# Patient Record
Sex: FEMALE | Race: WHITE | NOT HISPANIC OR LATINO | Employment: FULL TIME | URBAN - METROPOLITAN AREA
[De-identification: names, ages, dates, MRNs, and addresses within clinical notes are randomized per-mention and may not be internally consistent; named-entity substitution may affect disease eponyms.]

---

## 2019-08-26 LAB
LEFT EYE DM RETINOPATHY: POSITIVE
RIGHT EYE DM RETINOPATHY: POSITIVE

## 2019-10-15 LAB — HBA1C MFR BLD: 6 % (ref 4–6)

## 2019-12-13 ENCOUNTER — PATIENT OUTREACH (OUTPATIENT)
Dept: ADMINISTRATIVE | Facility: HOSPITAL | Age: 69
End: 2019-12-13

## 2019-12-13 NOTE — PROGRESS NOTES
Dear Nahomi,      [facility] is committed to your overall health.  To help you get the most out of each of your visits, we will review your information to make sure you are up to date on all of your recommended tests and/or procedures.      Crow Rahman MD  has found that your chart shows you may be due for the following:    One-time Hepatitis C Screening lab test(a viral condition that can harm the liver)  Tetanus Immunization  Osteoporosis screening (DEXA SCAN)  Fasting cholesterol labs (Lipid Panel)  Pneumonia Immunization  Mammogram    If you have had any of the above done at another facility, please bring the records with you or Fax them to [phone] so that your record at [facility] will be complete. If you have not had any of these tests or procedures done recently and would like to complete this testing ,  please call [phone] or send a message through your [facility] portal to your provider's office.     If you have an upcoming scheduled appointment for the above test and/or procedures, please disregard this letter.    If you are currently taking medication, please bring it with you to your appointment for review.      Thank you for letting us care for you,        Romi Brewster, Care Coordinator  [facility]  Phone: [phone]  Fax: [phone]

## 2019-12-13 NOTE — LETTER
December 13, 2019    Nahomi Loving  [address]  [place] [address]             [facility]  [address]  [place] [address]  Phone: [phone] [facility].  To help you get the most out of each of your visits, we will review your information to make sure you are up to date on all of your recommended tests and/or procedures.      Crow Rahman MD  has found that your chart shows you may be due for the following:    One-time Hepatitis C Screening lab test(a viral condition that can harm the liver)  Tetanus Immunization  Osteoporosis screening (DEXA SCAN)  Fasting cholesterol labs (Lipid Panel)  Pneumonia Immunization  Mammogram    If you have had any of the above done at another facility, please bring the records with you or Fax them to [phone] so that your record at [facility] will be complete. If you have not had any of these tests or procedures done recently and would like to complete this testing ,  please call [phone] or send a message through your [facility] portal to your provider's office.     If you have an upcoming scheduled appointment for the above test and/or procedures, please disregard this letter.    If you are currently taking medication, please bring it with you to your appointment for review.      Thank you for letting us care for you,    Romi Brewster, Care Coordinator  [facility]  Phone: [phone]  Fax: [phone]

## 2020-01-08 ENCOUNTER — OFFICE VISIT (OUTPATIENT)
Dept: FAMILY MEDICINE | Facility: CLINIC | Age: 70
End: 2020-01-08
Payer: MEDICARE

## 2020-01-08 VITALS
OXYGEN SATURATION: 96 % | TEMPERATURE: 98 F | HEIGHT: 60 IN | HEART RATE: 95 BPM | SYSTOLIC BLOOD PRESSURE: 126 MMHG | WEIGHT: 147.69 LBS | BODY MASS INDEX: 28.99 KG/M2 | DIASTOLIC BLOOD PRESSURE: 68 MMHG

## 2020-01-08 DIAGNOSIS — I10 HYPERTENSION, UNSPECIFIED TYPE: ICD-10-CM

## 2020-01-08 DIAGNOSIS — E79.0 HYPERURICEMIA: ICD-10-CM

## 2020-01-08 DIAGNOSIS — Z12.31 ENCOUNTER FOR SCREENING MAMMOGRAM FOR MALIGNANT NEOPLASM OF BREAST: ICD-10-CM

## 2020-01-08 DIAGNOSIS — Z12.39 BREAST CANCER SCREENING: ICD-10-CM

## 2020-01-08 DIAGNOSIS — E55.9 VITAMIN D DEFICIENCY: ICD-10-CM

## 2020-01-08 DIAGNOSIS — R25.2 LEG CRAMPS: ICD-10-CM

## 2020-01-08 DIAGNOSIS — E11.9 TYPE 2 DIABETES MELLITUS WITHOUT COMPLICATION, WITHOUT LONG-TERM CURRENT USE OF INSULIN: ICD-10-CM

## 2020-01-08 DIAGNOSIS — Z00.00 PREVENTATIVE HEALTH CARE: Primary | ICD-10-CM

## 2020-01-08 DIAGNOSIS — E03.9 HYPOTHYROIDISM, UNSPECIFIED TYPE: ICD-10-CM

## 2020-01-08 PROCEDURE — 90670 PCV13 VACCINE IM: CPT | Mod: S$GLB,,, | Performed by: FAMILY MEDICINE

## 2020-01-08 PROCEDURE — 90471 IMMUNIZATION ADMIN: CPT | Mod: S$GLB,,, | Performed by: FAMILY MEDICINE

## 2020-01-08 PROCEDURE — 3044F HG A1C LEVEL LT 7.0%: CPT | Mod: CPTII,S$GLB,, | Performed by: FAMILY MEDICINE

## 2020-01-08 PROCEDURE — 3078F PR MOST RECENT DIASTOLIC BLOOD PRESSURE < 80 MM HG: ICD-10-PCS | Mod: CPTII,S$GLB,, | Performed by: FAMILY MEDICINE

## 2020-01-08 PROCEDURE — 90471 PNEUMOCOCCAL CONJUGATE VACCINE 13-VALENT LESS THAN 5YO & GREATER THAN: ICD-10-PCS | Mod: S$GLB,,, | Performed by: FAMILY MEDICINE

## 2020-01-08 PROCEDURE — G0009 ADMIN PNEUMOCOCCAL VACCINE: HCPCS | Mod: 59,S$GLB,, | Performed by: FAMILY MEDICINE

## 2020-01-08 PROCEDURE — 3074F SYST BP LT 130 MM HG: CPT | Mod: CPTII,S$GLB,, | Performed by: FAMILY MEDICINE

## 2020-01-08 PROCEDURE — 3078F DIAST BP <80 MM HG: CPT | Mod: CPTII,S$GLB,, | Performed by: FAMILY MEDICINE

## 2020-01-08 PROCEDURE — 3074F PR MOST RECENT SYSTOLIC BLOOD PRESSURE < 130 MM HG: ICD-10-PCS | Mod: CPTII,S$GLB,, | Performed by: FAMILY MEDICINE

## 2020-01-08 PROCEDURE — 90670 PNEUMOCOCCAL CONJUGATE VACCINE 13-VALENT LESS THAN 5YO & GREATER THAN: ICD-10-PCS | Mod: S$GLB,,, | Performed by: FAMILY MEDICINE

## 2020-01-08 PROCEDURE — 3044F PR MOST RECENT HEMOGLOBIN A1C LEVEL <7.0%: ICD-10-PCS | Mod: CPTII,S$GLB,, | Performed by: FAMILY MEDICINE

## 2020-01-08 PROCEDURE — 90714 TD VACC NO PRESV 7 YRS+ IM: CPT | Mod: S$GLB,,, | Performed by: FAMILY MEDICINE

## 2020-01-08 PROCEDURE — 99387 PR PREVENTIVE VISIT,NEW,65 & OVER: ICD-10-PCS | Mod: 25,S$GLB,, | Performed by: FAMILY MEDICINE

## 2020-01-08 PROCEDURE — G0009 TD VACCINE GREATER THAN OR EQUAL TO 7YO PRESERVATIVE FREE IM: ICD-10-PCS | Mod: 59,S$GLB,, | Performed by: FAMILY MEDICINE

## 2020-01-08 PROCEDURE — 99999 PR PBB SHADOW E&M-EST. PATIENT-LVL III: CPT | Mod: PBBFAC,,, | Performed by: FAMILY MEDICINE

## 2020-01-08 PROCEDURE — 90714 TD VACCINE GREATER THAN OR EQUAL TO 7YO PRESERVATIVE FREE IM: ICD-10-PCS | Mod: S$GLB,,, | Performed by: FAMILY MEDICINE

## 2020-01-08 PROCEDURE — 99999 PR PBB SHADOW E&M-EST. PATIENT-LVL III: ICD-10-PCS | Mod: PBBFAC,,, | Performed by: FAMILY MEDICINE

## 2020-01-08 PROCEDURE — 99387 INIT PM E/M NEW PAT 65+ YRS: CPT | Mod: 25,S$GLB,, | Performed by: FAMILY MEDICINE

## 2020-01-08 RX ORDER — METFORMIN HYDROCHLORIDE 750 MG/1
1500 TABLET, EXTENDED RELEASE ORAL
COMMUNITY
Start: 2019-11-06 | End: 2020-09-08 | Stop reason: SDUPTHER

## 2020-01-08 RX ORDER — CLOBETASOL PROPIONATE 0.5 MG/G
CREAM TOPICAL
COMMUNITY
Start: 2019-12-02

## 2020-01-08 RX ORDER — ICOSAPENT ETHYL 1000 MG/1
CAPSULE ORAL
COMMUNITY
Start: 2019-12-10 | End: 2020-05-07 | Stop reason: ALTCHOICE

## 2020-01-08 RX ORDER — DICLOFENAC SODIUM 10 MG/G
2 GEL TOPICAL 4 TIMES DAILY PRN
COMMUNITY
Start: 2019-10-21 | End: 2020-09-08 | Stop reason: SDUPTHER

## 2020-01-08 RX ORDER — FLUTICASONE PROPIONATE 50 MCG
1 SPRAY, SUSPENSION (ML) NASAL
COMMUNITY
End: 2020-09-08 | Stop reason: SDUPTHER

## 2020-01-08 RX ORDER — SECUKINUMAB 150 MG/ML
INJECTION SUBCUTANEOUS
COMMUNITY
Start: 2020-01-07

## 2020-01-08 RX ORDER — AZELASTINE HYDROCHLORIDE 0.5 MG/ML
SOLUTION/ DROPS OPHTHALMIC
COMMUNITY
Start: 2019-03-20 | End: 2020-09-08 | Stop reason: SDUPTHER

## 2020-01-08 RX ORDER — MELOXICAM 15 MG/1
TABLET ORAL
COMMUNITY
Start: 2019-12-06 | End: 2020-09-08 | Stop reason: SDUPTHER

## 2020-01-08 RX ORDER — FERROUS SULFATE, DRIED 160(50) MG
1 TABLET, EXTENDED RELEASE ORAL
COMMUNITY

## 2020-01-08 NOTE — PROGRESS NOTES
CC: general exam and reestablish care    HPI:    Patient has not been seen since   SHe has recently been under the care of Dr. Suh and Dr. Jeff    DM2 - tolerating Metformin 750mg 2 tabs qhs  glimepride 1mg daily CAUSED SOME HYPOGLYCEMIA  BGs have been  250. Last Hgb A1c in 10/2019 was 6.3  Trulicity was being considered  GERD - controlled on Omeprazole 20mg daily  HTN - tolerating Avapro 150mg daily and Maxzide  Hypothyroidism - tolerating Levoxyl 25mcg  gout - tolerating allopurinol 200mg daily; No flares since her last appt.  Psoriasis - following with dermatology, Dr. Burciaga; using Cosentyx  HTG - taking vascepa 2 capsules BID  Vitamin d deficiency - taking vitamin D 2,000 IU daily    PAST MEDICAL HISTORY:  hypothyroidism   gastroesophageal reflux disease  h/o peptic ulcer  hypertension  fatty liver with hepatomegaly  DM2 (dx )  gout - 1st toe joint  depression with anxiety  RIGHT EYE CATRACT  psoriasis    PAST SURGICAL HISTORY:  abdominal hysterectomy  appendectomy   tonsillectomy  lap david   left knee arhtroscopy x 2  right hand CTS release  Right TKA    FAMILY HISTORY:  Father  at 79 of heart disease  Mother  87 of pneumonia  Sister  of an undiagnosed neurological disorder  Brother:  of lung cancer    SOCIAL HISTORY:  Tobacco use: 1/2 pack per day   Alcohol use:none  Ilicit drug use: none  Occupation:[employer]   Marital status:  son  at age 19 of electrocution  Children:2 (1  - [place])    REVIEW OF SYSTEMS:  GENERAL: No fever, chills, fatigability or weight changes.  SKIN/BREASTS: No rashes, sores, itching or changes in color or texture of skin. No changes in moles. No pain, swelling, tenderness, lumps, bleeding or discharge from nipples.  HEAD: No headaches or recent head trauma.   EYES: Visual acuity fine. Denies blurriness, tearing, itching, photophobia, diplopia, or visual changes.   EARS: Denies ear pain, discharge,  tinnitus or vertigo. Denies hearing loss.   NOSE: No loss of smell, epistaxis, postnasal drip, discharge, obstruction, or sneezing.  MOUTH & THROAT: No hoarseness, change in voice, swallowing difficulty. No excessive gum bleeding.   HEMATOLOGICAL/NODES: Denies swollen glands. No bleeding or bruising.  CHEST: No HOLDER, cyanosis, wheezing, cough or sputum production.  CARDIOVASCULAR: Denies chest pain, dyspnea, orthopnea, or palpitations.  GI/ABDOMEN: Appetite fine. No weight loss. Denies nausea, vomiting, diarrhea, constipation, abdominal pain, hematemesis or blood in stool.  URINARY: No dysuria,hematuria, nocturia, incontinence, flank pain, urgency, or urinary difficulty.  PERIPHERAL VASCULAR: No claudication, cold intolerance or cyanosis.  MUSCULOSKELETAL: No joint stiffness, pain, swelling, or loss of strength. Denies back pain.  NEUROLOGIC: No history of seizures, paralysis, alteration of gait or coordination.  ENDOCRINE: Sexual maturation. Denies weight change, heat/cold intolerance, hair loss or gain, loss of libido, polyuria, polydipsia, polyphagia, pruritus, unexplained flushing, or excessive sweating.   PSYCH: No crying spells. Denies anxiety symptoms.    PHYSICAL EXAM:  /68 (BP Location: Left arm, Patient Position: Sitting)   Pulse 95   Temp 98.4 °F (36.9 °C) (Oral)   Ht 5' (1.524 m)   Wt 67 kg (147 lb 11.3 oz)   SpO2 96%   BMI 28.85 kg/m²   APPEARANCE: Well nourished, well developed, neatly groomed, in no acute distress.   SKIN: Warm, moist and dry. No lesions or rashes.  EYES: Conjunctivae and lids clear. PERRL. EOMI.   EARS: External auditory canal clear. TM's intact with normal light reflex. No retraction or perforation.   NOSE: Mucosa pink. Nares clear.  MOUTH & THROAT: Oral mucosa pink. No tonsillar enlargement. No pharyngeal erythema or exudate.   NECK: Supple with full range of motion. No masses. No thyromegaly. No JVD or bruits.  LYMPH: No cervical, supraclavicular, axillary or inguinal  lymph node enlargement.  RESPIRATORY: Easy and unlabored respirations. Lungs clear to auscultation throughout all lung fields. No wheezes or rales.  CARDIOVASCULAR: Regular rate and rhythm. Normal S1, S2. No rubs, murmurs or gallops.   ABDOMEN: Bowel sounds normal. Nondistended and soft. No hepatosplenomegaly, masses, or tenderness.  EXTREMITIES: No edema or cyanosis. Pedal pulses 2+ bilaterally. No varicosities   MUSCULOSKELETAL: Full active range of motion without complaint of pain.   NEUROLOGIC: Cranial Nerves: II-XII grossly intact  Motor: 5/5 strength grossly in upper and lower extremities  Sensory: Intact to light touch distally.  Gait & Posture: Normal gait and fine motion.  PSYCH: Awake, alert, oriented to person, place, time, and situation. Pleasant and cooperative mood with intact judgment.    ASSESSMENT/PLAN:  Preventative health care  -     Hepatitis C antibody; Future; Expected date: 01/08/2020  -     (In Office Administered) Td Vaccine - Preservative Free  -     (In Office Administered) Pneumococcal Conjugate Vaccine (13 Valent) (IM)    Type 2 diabetes mellitus without complication, without long-term current use of insulin  -     Lipid panel; Future; Expected date: 01/08/2020  -     Microalbumin/creatinine urine ratio; Future; Expected date: 01/08/2020  -     Hemoglobin A1c; Future; Expected date: 01/08/2020    Hypertension, unspecified type  -     Comprehensive metabolic panel; Future; Expected date: 01/08/2020  -     CBC auto differential; Future; Expected date: 01/08/2020    Hyperuricemia  -     Uric acid; Future; Expected date: 01/08/2020    Hypothyroidism, unspecified type  -     TSH; Future; Expected date: 01/08/2020    Breast cancer screening  -     Mammo Digital Screening Bilat; Future; Expected date: 01/08/2020    Encounter for screening mammogram for malignant neoplasm of breast   -     Mammo Digital Screening Bilat; Future; Expected date: 01/08/2020    Vitamin D deficiency  -     Vitamin D;  Future; Expected date: 01/08/2020    Leg cramps  -     Magnesium; Future; Expected date: 01/08/2020      1. DM2 - continue Metformin 750mg BIDreemphasized diet and exercise; may add DPP4 inhibitor if Hgb A1c > 6.2  2. HTN (controlled) - continue Avapro 150mg daily and Maxzide  3. gout - continue allopurinol 200mg daily  4. hypothyroidism - continue Levothyroxine 25mcg daily  5. GERD (controlled) - continue Prilosec 20mg daily  Will get labs and schedule follow-up to review the results and discuss current status of each condition

## 2020-01-09 ENCOUNTER — PATIENT OUTREACH (OUTPATIENT)
Dept: ADMINISTRATIVE | Facility: HOSPITAL | Age: 70
End: 2020-01-09

## 2020-01-09 NOTE — PROGRESS NOTES
Health Maintenance Due   Topic Date Due    Hepatitis C Screening  1950    DEXA SCAN  11/04/1992    Shingles Vaccine (1 of 2) 11/04/2002    Lipid Panel  06/04/2016    Mammogram  08/09/2019    Influenza Vaccine (1) 09/01/2019     Future Appointments   Date Time Provider Department Center   1/10/2020  9:00 AM LAB, [facility] LAB [place]   1/20/2020  5:40 PM Crow Rahman MD [facility] MED [place]   1/29/2020  2:15 PM [facility] [id number] [facility] MAMMO [place]

## 2020-01-10 ENCOUNTER — TELEPHONE (OUTPATIENT)
Dept: FAMILY MEDICINE | Facility: CLINIC | Age: 70
End: 2020-01-10

## 2020-01-10 NOTE — TELEPHONE ENCOUNTER
----- Message from Bailey Duran sent at 1/10/2020  9:51 AM CST -----  Contact: Patient  Type: Needs Medical Advice    Who Called:  Patient  Best Call Back Number:   Additional Information: Calling to request that orders for labs be [facility]ed [facility] for her to complete tomorrow. Wanting to speak to the nurse.

## 2020-01-15 ENCOUNTER — TELEPHONE (OUTPATIENT)
Dept: FAMILY MEDICINE | Facility: CLINIC | Age: 70
End: 2020-01-15

## 2020-01-15 NOTE — TELEPHONE ENCOUNTER
----- Message from Jairon Ulloa sent at 1/15/2020 12:12 PM CST -----  Contact: Patient  Patient called in and wanted to speak with someone at the office regarding blood work and would like for the office to faxed over her results. The patient mention that she is leaving before 2pm and would like for the papers to be faxed before that time.The faxed number is    [phone]

## 2020-01-15 NOTE — TELEPHONE ENCOUNTER
Pt states that the lab orders are not coming over her fax. She is only receiving 1 page. Orders refaxed.

## 2020-01-15 NOTE — TELEPHONE ENCOUNTER
----- Message from Estefany Torres sent at 1/15/2020  1:33 PM CST -----  Contact: pt  Pt would like to be called back regarding her lab  orders    Pt can be reached at [phone]

## 2020-01-27 LAB
25(OH)D3 SERPL-MCNC: 49 NG/ML (ref 30–100)
ALBUMIN SERPL-MCNC: 4.4 G/DL (ref 3.6–5.1)
ALBUMIN/CREAT UR: 4 MCG/MG CREAT
ALBUMIN/GLOB SERPL: 1.5 (CALC) (ref 1–2.5)
ALP SERPL-CCNC: 60 U/L (ref 33–130)
ALT SERPL-CCNC: 24 U/L (ref 6–29)
AST SERPL-CCNC: 17 U/L (ref 10–35)
BASOPHILS # BLD AUTO: 73 CELLS/UL (ref 0–200)
BASOPHILS NFR BLD AUTO: 1 %
BILIRUB SERPL-MCNC: 0.3 MG/DL (ref 0.2–1.2)
BUN SERPL-MCNC: 30 MG/DL (ref 7–25)
BUN/CREAT SERPL: 30 (CALC) (ref 6–22)
CALCIUM SERPL-MCNC: 9.7 MG/DL (ref 8.6–10.4)
CHLORIDE SERPL-SCNC: 101 MMOL/L (ref 98–110)
CHOLEST SERPL-MCNC: 189 MG/DL
CHOLEST/HDLC SERPL: 3.4 (CALC)
CO2 SERPL-SCNC: 26 MMOL/L (ref 20–32)
CREAT SERPL-MCNC: 1.01 MG/DL (ref 0.5–0.99)
CREAT UR-MCNC: 114 MG/DL (ref 20–275)
EOSINOPHIL # BLD AUTO: 263 CELLS/UL (ref 15–500)
EOSINOPHIL NFR BLD AUTO: 3.6 %
ERYTHROCYTE [DISTWIDTH] IN BLOOD BY AUTOMATED COUNT: 13.8 % (ref 11–15)
GFRSERPLBLD MDRD-ARVRAT: 58 ML/MIN/1.73M2
GLOBULIN SER CALC-MCNC: 2.9 G/DL (CALC) (ref 1.9–3.7)
GLUCOSE SERPL-MCNC: 124 MG/DL (ref 65–99)
HBA1C MFR BLD: 6.1 % OF TOTAL HGB
HCT VFR BLD AUTO: 37.2 % (ref 35–45)
HCV AB S/CO SERPL IA: 0.01
HCV AB SERPL QL IA: NORMAL
HDLC SERPL-MCNC: 55 MG/DL
HGB BLD-MCNC: 12.8 G/DL (ref 11.7–15.5)
LDLC SERPL CALC-MCNC: 102 MG/DL (CALC)
LYMPHOCYTES # BLD AUTO: 2081 CELLS/UL (ref 850–3900)
LYMPHOCYTES NFR BLD AUTO: 28.5 %
MAGNESIUM SERPL-MCNC: 1.1 MG/DL (ref 1.5–2.5)
MCH RBC QN AUTO: 29.2 PG (ref 27–33)
MCHC RBC AUTO-ENTMCNC: 34.4 G/DL (ref 32–36)
MCV RBC AUTO: 84.9 FL (ref 80–100)
MICROALBUMIN UR-MCNC: 0.5 MG/DL
MONOCYTES # BLD AUTO: 423 CELLS/UL (ref 200–950)
MONOCYTES NFR BLD AUTO: 5.8 %
NEUTROPHILS # BLD AUTO: 4460 CELLS/UL (ref 1500–7800)
NEUTROPHILS NFR BLD AUTO: 61.1 %
NONHDLC SERPL-MCNC: 134 MG/DL (CALC)
PLATELET # BLD AUTO: 304 THOUSAND/UL (ref 140–400)
PMV BLD REES-ECKER: 11.8 FL (ref 7.5–12.5)
POTASSIUM SERPL-SCNC: 3.9 MMOL/L (ref 3.5–5.3)
PROT SERPL-MCNC: 7.3 G/DL (ref 6.1–8.1)
RBC # BLD AUTO: 4.38 MILLION/UL (ref 3.8–5.1)
SODIUM SERPL-SCNC: 139 MMOL/L (ref 135–146)
TRIGL SERPL-MCNC: 201 MG/DL
TSH SERPL-ACNC: 1.96 MIU/L (ref 0.4–4.5)
URATE SERPL-MCNC: 7.7 MG/DL (ref 2.5–7)
WBC # BLD AUTO: 7.3 THOUSAND/UL (ref 3.8–10.8)

## 2020-01-29 ENCOUNTER — HOSPITAL ENCOUNTER (OUTPATIENT)
Dept: RADIOLOGY | Facility: HOSPITAL | Age: 70
Discharge: HOME | End: 2020-01-29
Attending: FAMILY MEDICINE
Payer: MEDICARE

## 2020-01-29 ENCOUNTER — OFFICE VISIT (OUTPATIENT)
Dept: FAMILY MEDICINE | Facility: CLINIC | Age: 70
End: 2020-01-29
Payer: MEDICARE

## 2020-01-29 VITALS
TEMPERATURE: 98 F | DIASTOLIC BLOOD PRESSURE: 76 MMHG | WEIGHT: 150.81 LBS | SYSTOLIC BLOOD PRESSURE: 118 MMHG | OXYGEN SATURATION: 96 % | BODY MASS INDEX: 29.45 KG/M2 | HEART RATE: 87 BPM

## 2020-01-29 DIAGNOSIS — K21.9 GASTROESOPHAGEAL REFLUX DISEASE, ESOPHAGITIS PRESENCE NOT SPECIFIED: ICD-10-CM

## 2020-01-29 DIAGNOSIS — E11.9 TYPE 2 DIABETES MELLITUS WITHOUT COMPLICATION, WITHOUT LONG-TERM CURRENT USE OF INSULIN: Primary | ICD-10-CM

## 2020-01-29 DIAGNOSIS — Z12.31 ENCOUNTER FOR SCREENING MAMMOGRAM FOR MALIGNANT NEOPLASM OF BREAST: ICD-10-CM

## 2020-01-29 DIAGNOSIS — E79.0 HYPERURICEMIA: ICD-10-CM

## 2020-01-29 DIAGNOSIS — E03.9 HYPOTHYROIDISM, UNSPECIFIED TYPE: ICD-10-CM

## 2020-01-29 DIAGNOSIS — E78.5 HYPERLIPIDEMIA, UNSPECIFIED HYPERLIPIDEMIA TYPE: ICD-10-CM

## 2020-01-29 DIAGNOSIS — I10 HYPERTENSION, UNSPECIFIED TYPE: ICD-10-CM

## 2020-01-29 DIAGNOSIS — N39.0 URINARY TRACT INFECTION WITHOUT HEMATURIA, SITE UNSPECIFIED: ICD-10-CM

## 2020-01-29 DIAGNOSIS — Z12.39 BREAST CANCER SCREENING: ICD-10-CM

## 2020-01-29 LAB
BILIRUB SERPL-MCNC: NEGATIVE MG/DL
BILIRUB UR QL STRIP: NEGATIVE
BLOOD URINE, POC: NEGATIVE
CLARITY UR: CLEAR
COLOR UR: YELLOW
COLOR, POC UA: ABNORMAL
GLUCOSE UR QL STRIP: NEGATIVE
GLUCOSE UR QL STRIP: NORMAL
HGB UR QL STRIP: NEGATIVE
KETONES UR QL STRIP: NEGATIVE
KETONES UR QL STRIP: NEGATIVE
LEUKOCYTE ESTERASE UR QL STRIP: NEGATIVE
LEUKOCYTE ESTERASE URINE, POC: ABNORMAL
NITRITE UR QL STRIP: NEGATIVE
NITRITE, POC UA: NEGATIVE
PH UR STRIP: 6 [PH] (ref 5–8)
PH, POC UA: 6
PROT UR QL STRIP: NEGATIVE
PROTEIN, POC: NEGATIVE
SP GR UR STRIP: 1.02 (ref 1–1.03)
SPECIFIC GRAVITY, POC UA: 1.01
URN SPEC COLLECT METH UR: NORMAL
UROBILINOGEN, POC UA: NORMAL

## 2020-01-29 PROCEDURE — 3078F PR MOST RECENT DIASTOLIC BLOOD PRESSURE < 80 MM HG: ICD-10-PCS | Mod: CPTII,S$GLB,, | Performed by: FAMILY MEDICINE

## 2020-01-29 PROCEDURE — 77067 SCR MAMMO BI INCL CAD: CPT | Mod: TC,PO

## 2020-01-29 PROCEDURE — 3074F SYST BP LT 130 MM HG: CPT | Mod: CPTII,S$GLB,, | Performed by: FAMILY MEDICINE

## 2020-01-29 PROCEDURE — 99999 PR PBB SHADOW E&M-EST. PATIENT-LVL IV: CPT | Mod: PBBFAC,,, | Performed by: FAMILY MEDICINE

## 2020-01-29 PROCEDURE — 3074F PR MOST RECENT SYSTOLIC BLOOD PRESSURE < 130 MM HG: ICD-10-PCS | Mod: CPTII,S$GLB,, | Performed by: FAMILY MEDICINE

## 2020-01-29 PROCEDURE — 81002 URINALYSIS NONAUTO W/O SCOPE: CPT | Mod: S$GLB,,, | Performed by: FAMILY MEDICINE

## 2020-01-29 PROCEDURE — 77067 SCR MAMMO BI INCL CAD: CPT | Mod: 26,,, | Performed by: RADIOLOGY

## 2020-01-29 PROCEDURE — 1126F PR PAIN SEVERITY QUANTIFIED, NO PAIN PRESENT: ICD-10-PCS | Mod: S$GLB,,, | Performed by: FAMILY MEDICINE

## 2020-01-29 PROCEDURE — 77067 MAMMO DIGITAL SCREENING BILAT WITH TOMOSYNTHESIS_CAD: ICD-10-PCS | Mod: 26,,, | Performed by: RADIOLOGY

## 2020-01-29 PROCEDURE — 1159F MED LIST DOCD IN RCRD: CPT | Mod: S$GLB,,, | Performed by: FAMILY MEDICINE

## 2020-01-29 PROCEDURE — 77063 MAMMO DIGITAL SCREENING BILAT WITH TOMOSYNTHESIS_CAD: ICD-10-PCS | Mod: 26,,, | Performed by: RADIOLOGY

## 2020-01-29 PROCEDURE — 1101F PT FALLS ASSESS-DOCD LE1/YR: CPT | Mod: CPTII,S$GLB,, | Performed by: FAMILY MEDICINE

## 2020-01-29 PROCEDURE — 99999 PR PBB SHADOW E&M-EST. PATIENT-LVL IV: ICD-10-PCS | Mod: PBBFAC,,, | Performed by: FAMILY MEDICINE

## 2020-01-29 PROCEDURE — 3044F PR MOST RECENT HEMOGLOBIN A1C LEVEL <7.0%: ICD-10-PCS | Mod: CPTII,S$GLB,, | Performed by: FAMILY MEDICINE

## 2020-01-29 PROCEDURE — 81002 POCT URINE DIPSTICK WITHOUT MICROSCOPE: ICD-10-PCS | Mod: S$GLB,,, | Performed by: FAMILY MEDICINE

## 2020-01-29 PROCEDURE — 1101F PR PT FALLS ASSESS DOC 0-1 FALLS W/OUT INJ PAST YR: ICD-10-PCS | Mod: CPTII,S$GLB,, | Performed by: FAMILY MEDICINE

## 2020-01-29 PROCEDURE — 99214 OFFICE O/P EST MOD 30 MIN: CPT | Mod: 25,S$GLB,, | Performed by: FAMILY MEDICINE

## 2020-01-29 PROCEDURE — 99214 PR OFFICE/OUTPT VISIT, EST, LEVL IV, 30-39 MIN: ICD-10-PCS | Mod: 25,S$GLB,, | Performed by: FAMILY MEDICINE

## 2020-01-29 PROCEDURE — 1126F AMNT PAIN NOTED NONE PRSNT: CPT | Mod: S$GLB,,, | Performed by: FAMILY MEDICINE

## 2020-01-29 PROCEDURE — 3078F DIAST BP <80 MM HG: CPT | Mod: CPTII,S$GLB,, | Performed by: FAMILY MEDICINE

## 2020-01-29 PROCEDURE — 1159F PR MEDICATION LIST DOCUMENTED IN MEDICAL RECORD: ICD-10-PCS | Mod: S$GLB,,, | Performed by: FAMILY MEDICINE

## 2020-01-29 PROCEDURE — 81003 URINALYSIS AUTO W/O SCOPE: CPT | Mod: PO

## 2020-01-29 PROCEDURE — 77063 BREAST TOMOSYNTHESIS BI: CPT | Mod: 26,,, | Performed by: RADIOLOGY

## 2020-01-29 PROCEDURE — 3044F HG A1C LEVEL LT 7.0%: CPT | Mod: CPTII,S$GLB,, | Performed by: FAMILY MEDICINE

## 2020-01-29 RX ORDER — ROSUVASTATIN CALCIUM 10 MG/1
10 TABLET, COATED ORAL DAILY
Qty: 90 TABLET | Refills: 3 | Status: SHIPPED | OUTPATIENT
Start: 2020-01-29 | End: 2020-05-06 | Stop reason: SDUPTHER

## 2020-01-29 RX ORDER — ALLOPURINOL 300 MG/1
300 TABLET ORAL DAILY
Qty: 90 TABLET | Refills: 3 | Status: SHIPPED | OUTPATIENT
Start: 2020-01-29 | End: 2020-05-06 | Stop reason: SDUPTHER

## 2020-01-29 NOTE — PROGRESS NOTES
CC: diabetes    HPI:    Here to f/u on chronic health issues    DM2 - tolerating Metformin 750mg 2 tabs qhs  glimepride 1mg daily CAUSED SOME HYPOGLYCEMIA  BGs have been  250. Last Hgb A1c in 10/2019 was 6.3  Trulicity was being considered  GERD - controlled on Omeprazole 20mg daily  HTN - tolerating Avapro 150mg daily and Maxzide  Hypothyroidism - tolerating Levoxyl 25mcg  gout - tolerating allopurinol 200mg daily; No flares since her last appt.  Psoriasis - following with dermatology, Dr. Burciaga; using Cosentyx  HTG - taking vascepa 2 capsules BID  Vitamin d deficiency - taking vitamin D 2,000 IU daily    PAST MEDICAL HISTORY:  hypothyroidism   gastroesophageal reflux disease  h/o peptic ulcer  hypertension  fatty liver with hepatomegaly  DM2 (dx )  gout - 1st toe joint  depression with anxiety  RIGHT EYE CATRACT  psoriasis    PAST SURGICAL HISTORY:  abdominal hysterectomy  appendectomy   tonsillectomy  lap david   left knee arhtroscopy x 2  right hand CTS release  Right TKA    FAMILY HISTORY:  Father  at 79 of heart disease  Mother  87 of pneumonia  Sister  of an undiagnosed neurological disorder  Brother:  of lung cancer    SOCIAL HISTORY:  Tobacco use: 1/2 pack per day   Alcohol use:none  Ilicit drug use: none  Occupation:[employer]   Marital status:  son  at age 19 of electrocution  Children:2 (1  - [place])    REVIEW OF SYSTEMS:  GENERAL: No fever, chills, fatigability or weight changes.  SKIN/BREASTS: No rashes, sores, itching or changes in color or texture of skin. No changes in moles. No pain, swelling, tenderness, lumps, bleeding or discharge from nipples.  HEAD: No headaches or recent head trauma.   EYES: Visual acuity fine. Denies blurriness, tearing, itching, photophobia, diplopia, or visual changes.   EARS: Denies ear pain, discharge, tinnitus or vertigo. Denies hearing loss.   NOSE: No loss of smell, epistaxis, postnasal drip,  discharge, obstruction, or sneezing.  MOUTH & THROAT: No hoarseness, change in voice, swallowing difficulty. No excessive gum bleeding.   HEMATOLOGICAL/NODES: Denies swollen glands. No bleeding or bruising.  CHEST: No HOLDER, cyanosis, wheezing, cough or sputum production.  CARDIOVASCULAR: Denies chest pain, dyspnea, orthopnea, or palpitations.  GI/ABDOMEN: Appetite fine. No weight loss. Denies nausea, vomiting, diarrhea, constipation, abdominal pain, hematemesis or blood in stool.  URINARY: No dysuria,hematuria, nocturia, incontinence, flank pain, urgency, or urinary difficulty.  PERIPHERAL VASCULAR: No claudication, cold intolerance or cyanosis.  MUSCULOSKELETAL: No joint stiffness, pain, swelling, or loss of strength. Denies back pain.  NEUROLOGIC: No history of seizures, paralysis, alteration of gait or coordination.  ENDOCRINE: Sexual maturation. Denies weight change, heat/cold intolerance, hair loss or gain, loss of libido, polyuria, polydipsia, polyphagia, pruritus, unexplained flushing, or excessive sweating.   PSYCH: No crying spells. Denies anxiety symptoms.    PHYSICAL EXAM:  /76 (BP Location: Left arm, Patient Position: Sitting)   Pulse 87   Temp 98.1 °F (36.7 °C) (Oral)   Wt 68.4 kg (150 lb 12.7 oz)   SpO2 96%   BMI 29.45 kg/m²   APPEARANCE: Well nourished, well developed, neatly groomed, in no acute distress.   SKIN: Warm, moist and dry. No lesions or rashes.  EYES: Conjunctivae and lids clear. PERRL. EOMI.   EARS: External auditory canal clear. TM's intact with normal light reflex. No retraction or perforation.   NOSE: Mucosa pink. Nares clear.  MOUTH & THROAT: Oral mucosa pink. No tonsillar enlargement. No pharyngeal erythema or exudate.   NECK: Supple with full range of motion. No masses. No thyromegaly. No JVD or bruits.  LYMPH: No cervical, supraclavicular, axillary or inguinal lymph node enlargement.  RESPIRATORY: Easy and unlabored respirations. Lungs clear to auscultation throughout all  lung fields. No wheezes or rales.  CARDIOVASCULAR: Regular rate and rhythm. Normal S1, S2. No rubs, murmurs or gallops.   ABDOMEN: Bowel sounds normal. Nondistended and soft. No hepatosplenomegaly, masses, or tenderness.  EXTREMITIES: No edema or cyanosis. Pedal pulses 2+ bilaterally. No varicosities   MUSCULOSKELETAL: Full active range of motion without complaint of pain.   NEUROLOGIC: Cranial Nerves: II-XII grossly intact  Motor: 5/5 strength grossly in upper and lower extremities  Sensory: Intact to light touch distally.  Gait & Posture: Normal gait and fine motion.  PSYCH: Awake, alert, oriented to person, place, time, and situation. Pleasant and cooperative mood with intact judgment.  Foot exam: inspection normal, sensation intact; 2+ DP pulses bilaterally    Results for orders placed or performed in visit on 01/25/20   Lipid panel   Result Value Ref Range    Cholesterol 189 <200 mg/dL    HDL 55 >50 mg/dL    Triglycerides 201 (H) <150 mg/dL    LDL Cholesterol 102 (H) mg/dL (calc)    Hdl/Cholesterol Ratio 3.4 <5.0 (calc)    Non HDL Chol. (LDL+VLDL) 134 (H) <130 mg/dL (calc)   Microalbumin/creatinine urine ratio   Result Value Ref Range    Creatinine, Random Ur 114 20 - 275 mg/dL    Microalb, Ur 0.5 See Note: mg/dL    Microalb Creat Ratio 4 <30 mcg/mg creat   Magnesium   Result Value Ref Range    Magnesium 1.1 (L) 1.5 - 2.5 mg/dL   Uric acid   Result Value Ref Range    Uric Acid 7.7 (H) 2.5 - 7.0 mg/dL   Comprehensive metabolic panel   Result Value Ref Range    Glucose 124 (H) 65 - 99 mg/dL    BUN, Bld 30 (H) 7 - 25 mg/dL    Creatinine 1.01 (H) 0.50 - 0.99 mg/dL    eGFR if non  58 (L) > OR = 60 mL/min/1.73m2    eGFR if African American 67 > OR = 60 mL/min/1.73m2    BUN/Creatinine Ratio 30 (H) 6 - 22 (calc)    Sodium 139 135 - 146 mmol/L    Potassium 3.9 3.5 - 5.3 mmol/L    Chloride 101 98 - 110 mmol/L    CO2 26 20 - 32 mmol/L    Calcium 9.7 8.6 - 10.4 mg/dL    Total Protein 7.3 6.1 - 8.1 g/dL     Albumin 4.4 3.6 - 5.1 g/dL    Globulin, Total 2.9 1.9 - 3.7 g/dL (calc)    Albumin/Globulin Ratio 1.5 1.0 - 2.5 (calc)    Total Bilirubin 0.3 0.2 - 1.2 mg/dL    Alkaline Phosphatase 60 33 - 130 U/L    AST 17 10 - 35 U/L    ALT 24 6 - 29 U/L   CBC auto differential   Result Value Ref Range    WBC 7.3 3.8 - 10.8 Thousand/uL    RBC 4.38 3.80 - 5.10 Million/uL    Hemoglobin 12.8 11.7 - 15.5 g/dL    Hematocrit 37.2 35.0 - 45.0 %    Mean Corpuscular Volume 84.9 80.0 - 100.0 fL    Mean Corpuscular Hemoglobin 29.2 27.0 - 33.0 pg    Mean Corpuscular Hemoglobin Conc 34.4 32.0 - 36.0 g/dL    RDW 13.8 11.0 - 15.0 %    Platelets 304 140 - 400 Thousand/uL    MPV 11.8 7.5 - 12.5 fL    Neutrophils Absolute 4,460 1,500 - 7,800 cells/uL    Lymph # 2,081 850 - 3,900 cells/uL    Mono # 423 200 - 950 cells/uL    Eos # 263 15 - 500 cells/uL    Baso # 73 0 - 200 cells/uL    Neutrophils Relative 61.1 %    Lymph% 28.5 %    Mono% 5.8 %    Eosinophil% 3.6 %    Basophil% 1.0 %   Hepatitis C antibody   Result Value Ref Range    Hepatitis C Ab NON-REACTIVE NON-REACTIVE    Signal/Cutoff 0.01 <1.00   TSH   Result Value Ref Range    TSH 1.96 0.40 - 4.50 mIU/L   Vitamin D   Result Value Ref Range    Vitamin D, 25-OH, Total 49 30 - 100 ng/mL   Hemoglobin A1c   Result Value Ref Range    Hemoglobin A1C 6.1 (H) <5.7 % of total Hgb         ASSESSMENT/PLAN:  Type 2 diabetes mellitus without complication, without long-term current use of insulin  -     dulaglutide (TRULICITY) 0.75 mg/0.5 mL PnIj; Inject 0.5 mLs (0.75 mg total) into the skin once a week.  Dispense: 4 Syringe; Refill: 11  -     Comprehensive metabolic panel; Future; Expected date: 04/29/2020  -     Hemoglobin A1c; Future; Expected date: 04/29/2020    Hypertension, unspecified type    Hypothyroidism, unspecified type    Hyperuricemia  -     allopurinol (ZYLOPRIM) 300 MG tablet; Take 1 tablet (300 mg total) by mouth once daily.  Dispense: 90 tablet; Refill: 3  -     Uric acid; Future; Expected  date: 04/29/2020    Gastroesophageal reflux disease, esophagitis presence not specified    Urinary tract infection without hematuria, site unspecified  -     Urinalysis, Reflex to Urine Culture Urine, Clean Catch  -     POCT URINE DIPSTICK WITHOUT MICROSCOPE    Hyperlipidemia, unspecified hyperlipidemia type  -     rosuvastatin (CRESTOR) 10 MG tablet; Take 1 tablet (10 mg total) by mouth once daily.  Dispense: 90 tablet; Refill: 3  -     Lipid panel; Future; Expected date: 04/29/2020      1. DM2 - continue Metformin 750mg BID; reemphasized diet and exercise; add trulicity  2. HTN (controlled) - continue Avapro 150mg daily and Maxzide  3. gout - increase to allopurinol 300mg daily  4. hypothyroidism - continue Levothyroxine 25mcg daily  5. GERD (controlled) - continue Prilosec 20mg daily  5. HLD - stop vescepa; begin crestor 10mg daily  F/u 3 months with labs (printed at [facility])

## 2020-02-03 ENCOUNTER — TELEPHONE (OUTPATIENT)
Dept: FAMILY MEDICINE | Facility: CLINIC | Age: 70
End: 2020-02-03

## 2020-02-03 ENCOUNTER — TELEPHONE (OUTPATIENT)
Dept: RADIOLOGY | Facility: HOSPITAL | Age: 70
End: 2020-02-03

## 2020-02-06 ENCOUNTER — TELEPHONE (OUTPATIENT)
Dept: FAMILY MEDICINE | Facility: CLINIC | Age: 70
End: 2020-02-06

## 2020-02-06 NOTE — TELEPHONE ENCOUNTER
Pt states she was recently prescribed crestor and allopurinol and one of them has caused her to have an upset stomach. Pt states she stopped both of them a few days ago and her stomach has been fine. Pt would like to know what she should do. Please advise.

## 2020-02-06 NOTE — TELEPHONE ENCOUNTER
Have her resume just the Crestor and take that for 1 week. If no upset stomach, have her again try to resume the allopurinol.

## 2020-02-06 NOTE — TELEPHONE ENCOUNTER
Left message on machine   [Follow-Up Visit] : a follow-up visit for [FreeTextEntry2] : Right shoulder pain.

## 2020-02-06 NOTE — TELEPHONE ENCOUNTER
----- Message from Dianna Sabillon sent at 2/6/2020  8:48 AM CST -----  Type: Needs Medical Advice    Who Called:  Patient  Best Call Back Number: [phone]   Additional Information: Patient needs to speak with nurse concerning medication: Allopurinol or Crestor upsetting her stomach/not sure which one is causing issues/please call back to advise. (If no answer, please leave message)

## 2020-02-07 ENCOUNTER — HOSPITAL ENCOUNTER (OUTPATIENT)
Dept: RADIOLOGY | Facility: HOSPITAL | Age: 70
Discharge: HOME | End: 2020-02-07
Attending: FAMILY MEDICINE
Payer: MEDICARE

## 2020-02-07 ENCOUNTER — TELEPHONE (OUTPATIENT)
Dept: RADIOLOGY | Facility: HOSPITAL | Age: 70
End: 2020-02-07

## 2020-02-07 ENCOUNTER — TELEPHONE (OUTPATIENT)
Dept: FAMILY MEDICINE | Facility: CLINIC | Age: 70
End: 2020-02-07

## 2020-02-07 DIAGNOSIS — R92.8 ABNORMAL MAMMOGRAM OF LEFT BREAST: ICD-10-CM

## 2020-02-07 PROCEDURE — 77065 DX MAMMO INCL CAD UNI: CPT | Mod: TC,PO,LT

## 2020-02-07 PROCEDURE — 77061 BREAST TOMOSYNTHESIS UNI: CPT | Mod: 26,LT,, | Performed by: RADIOLOGY

## 2020-02-07 PROCEDURE — 76642 ULTRASOUND BREAST LIMITED: CPT | Mod: TC,PO,LT

## 2020-02-07 PROCEDURE — 76642 US BREAST LEFT LIMITED: ICD-10-PCS | Mod: 26,LT,, | Performed by: RADIOLOGY

## 2020-02-07 PROCEDURE — 77065 DX MAMMO INCL CAD UNI: CPT | Mod: 26,LT,, | Performed by: RADIOLOGY

## 2020-02-07 PROCEDURE — 76642 ULTRASOUND BREAST LIMITED: CPT | Mod: 26,LT,, | Performed by: RADIOLOGY

## 2020-02-07 PROCEDURE — 77065 MAMMO DIGITAL DIAGNOSTIC LEFT WITH TOMOSYNTHESIS_CAD: ICD-10-PCS | Mod: 26,LT,, | Performed by: RADIOLOGY

## 2020-02-07 PROCEDURE — 77061 BREAST TOMOSYNTHESIS UNI: CPT | Mod: TC,PO,LT

## 2020-02-07 PROCEDURE — 77061 MAMMO DIGITAL DIAGNOSTIC LEFT WITH TOMOSYNTHESIS_CAD: ICD-10-PCS | Mod: 26,LT,, | Performed by: RADIOLOGY

## 2020-02-07 NOTE — TELEPHONE ENCOUNTER
----- Message from Nivia Mack sent at 2/7/2020 12:15 PM CST -----  Contact: Patient  Type: Needs Medical Advice    Who Called:  Patient  Best Call Back Number: [phone]  Additional Information: Patient is calling to see if she can be prescribed a mild nerve medication for her biopsy she is having.Please call back and advise.

## 2020-02-07 NOTE — TELEPHONE ENCOUNTER
Miroslava from [facility] [place] called patient she is instructed Stereotactic breast biopsy is scheduled 2/10/2020

## 2020-02-07 NOTE — TELEPHONE ENCOUNTER
Patient notified of diagnostic mammogram results. Left breast biopsy will be scheduled at [facility] [place]. Miroslava will call her to schedule sometime next week.   Patient is also requesting medication for her anxiety

## 2020-02-07 NOTE — TELEPHONE ENCOUNTER
Pt states she just found out that she has to have a breast biopsy on Monday and states she is very nervous about this. Pt would like to know if she could get a mild rx for her nerves. Please advise.

## 2020-04-17 ENCOUNTER — TELEPHONE (OUTPATIENT)
Dept: FAMILY MEDICINE | Facility: CLINIC | Age: 70
End: 2020-04-17

## 2020-04-17 ENCOUNTER — OFFICE VISIT (OUTPATIENT)
Dept: FAMILY MEDICINE | Facility: CLINIC | Age: 70
End: 2020-04-17
Payer: MEDICARE

## 2020-04-17 DIAGNOSIS — R30.0 DYSURIA: Primary | ICD-10-CM

## 2020-04-17 DIAGNOSIS — R39.15 URINARY URGENCY: ICD-10-CM

## 2020-04-17 PROCEDURE — 99442 PR PHYSICIAN TELEPHONE EVALUATION 11-20 MIN: CPT | Mod: 95,,, | Performed by: NURSE PRACTITIONER

## 2020-04-17 PROCEDURE — 99442 PR PHYSICIAN TELEPHONE EVALUATION 11-20 MIN: ICD-10-PCS | Mod: 95,,, | Performed by: NURSE PRACTITIONER

## 2020-04-17 RX ORDER — CIPROFLOXACIN 500 MG/1
500 TABLET ORAL 2 TIMES DAILY
Qty: 10 TABLET | Refills: 0 | Status: SHIPPED | OUTPATIENT
Start: 2020-04-17 | End: 2020-04-22

## 2020-04-17 NOTE — TELEPHONE ENCOUNTER
----- Message from Raimundo Lopez sent at 4/17/2020  8:01 AM CDT -----  Contact: pt  Type: Needs Medical Advice    Who Called:  pt    Best Call Back Number: [phone]  Additional Information: pt states she has a UTI and would like some medication called in at her pharmacy. Please call to advise.     [facility] [address] [address]  [address] [address] [address]  Phone: [phone] Fax: [phone]

## 2020-04-17 NOTE — PROGRESS NOTES
Subjective:       Patient ID: Nahomi Loving is a 69 y.o. female.    Chief Complaint: No chief complaint on file.  This is her first visit with me in primary care.  She was last seen by her PCP, Josiah on 01/29/2020.  HPI  There were no vitals filed for this visit.  Review of Systems   Constitutional: Negative for fever.   Gastrointestinal: Negative for abdominal pain, nausea and vomiting.   Genitourinary: Positive for dysuria and urgency. Negative for vaginal bleeding and vaginal discharge.        Burning with urination and only small amounts   Musculoskeletal:        Complain of bilateral low back discomfort       Symptoms began Monday  She has increased fluids  The patient location is: [facility]  The chief complaint leading to consultation is: urinary urgency, only going little bit when go, urgency   Visit type: audio only  Total time spent with patient: 11:29-11:42  Each patient to whom he or she provides medical services by telemedicine is:  (1) informed of the relationship between the physician and patient and the respective role of any other health care provider with respect to management of the patient; and (2) notified that he or she may decline to receive medical services by telemedicine and may withdraw from such care at any time.    Notes:   temperature is currently 97.6-97.88  She states not currently sexually active  Denies blood  States no new bubble baths and is showering  States has had symptoms prior  No visible blood, denies temperature  Objective:      Physical Exam   Constitutional: She is oriented to person, place, and time. She is active and cooperative.   Neurological: She is alert and oriented to person, place, and time.   Psychiatric: She has a normal mood and affect. Her speech is normal and behavior is normal. Judgment and thought content normal. Cognition and memory are normal.       Assessment & Plan:       Dysuria  -     ciprofloxacin HCl (CIPRO) 500 MG tablet; Take 1 tablet  (500 mg total) by mouth 2 (two) times daily. for 5 days  Dispense: 10 tablet; Refill: 0    Urinary urgency  -     ciprofloxacin HCl (CIPRO) 500 MG tablet; Take 1 tablet (500 mg total) by mouth 2 (two) times daily. for 5 days  Dispense: 10 tablet; Refill: 0      Medication List with Changes/Refills   New Medications    CIPROFLOXACIN HCL (CIPRO) 500 MG TABLET    Take 1 tablet (500 mg total) by mouth 2 (two) times daily. for 5 days   Current Medications    ALLOPURINOL (ZYLOPRIM) 300 MG TABLET    Take 1 tablet (300 mg total) by mouth once daily.    ASPIRIN (ECOTRIN) 81 MG EC TABLET    Take 81 mg by mouth once daily.      AZELASTINE (OPTIVAR) 0.05 % OPHTHALMIC SOLUTION        AZELASTINE-FLUTICASONE 137-50 MCG/SPRAY SPRY NASSAL SPRAY    1 spray by Each Nare route 2 (two) times daily.    BETAMETHASONE VALERATE 0.1% (VALISONE) 0.1 % CREA    Apply topically 2 (two) times daily.    CALCIUM-VITAMIN D3 (OS-MYNOR 500 + D3) 500 MG(1,250MG) -200 UNIT PER TABLET    Take 1 tablet by mouth.    CLOBETASOL (TEMOVATE) 0.05 % CREAM        COSENTYX PEN, 2 PENS, 150 MG/ML PNIJ        DICLOFENAC SODIUM (VOLTAREN) 1 % GEL    Apply 2 g topically 4 (four) times daily as needed.    DOXYCYCLINE (MONODOX) 100 MG CAPSULE    Take 1 capsule (100 mg total) by mouth 2 (two) times daily.    DULAGLUTIDE (TRULICITY) 0.75 MG/0.5 ML PNIJ    Inject 0.5 mLs (0.75 mg total) into the skin once a week.    FLUTICASONE PROPIONATE (FLONASE) 50 MCG/ACTUATION NASAL SPRAY    1 spray by Nasal route.    GLIMEPIRIDE (AMARYL) 1 MG TABLET    Take 1 tablet (1 mg total) by mouth before breakfast.    IRBESARTAN (AVAPRO) 150 MG TABLET    Take 1 tablet (150 mg total) by mouth every evening.    LEVOTHYROXINE (SYNTHROID) 25 MCG TABLET    TAKE 1 TABLET DAILY    MELOXICAM (MOBIC) 15 MG TABLET    TAKE ONE TABLET BY MOUTH DAILY AS NEEDED    METFORMIN (GLUCOPHAGE-XR) 750 MG 24 HR TABLET    Take 1,500 mg by mouth.    METRONIDAZOLE 0.75% (METROCREAM) 0.75 % CREA    Apply topically 2  (two) times daily.    MOMETASONE (NASONEX) 50 MCG/ACTUATION NASAL SPRAY    2 sprays by Nasal route once daily.    MULTIVITAMIN CAPSULE    Take 1 capsule by mouth.    OMEPRAZOLE (PRILOSEC) 20 MG CAPSULE    Take 1 capsule (20 mg total) by mouth once daily.    ROSUVASTATIN (CRESTOR) 10 MG TABLET    Take 1 tablet (10 mg total) by mouth once daily.    TRIAMTERENE-HYDROCHLOROTHIAZIDE 75-50 MG (MAXZIDE) 75-50 MG PER TABLET    Take 1 tablet by mouth once daily.    VALACYCLOVIR (VALTREX) 1000 MG TABLET    Take 1 tablet (1,000 mg total) by mouth 3 (three) times daily.    VASCEPA 1 GRAM CAP        ZOLPIDEM (AMBIEN) 10 MG TAB    Take 1 tablet (10 mg total) by mouth nightly as needed.         No follow-ups on file.

## 2020-04-17 NOTE — TELEPHONE ENCOUNTER
----- Message from Zabrina Lee sent at 4/17/2020 11:05 AM CDT -----  Contact: self  Type:  Patient Returning Call    Who Called:  Patient   Who Left Message for Patient:  Bri  Does the patient know what this is regarding?:  Possible UTI wanted med  Best Call Back Number:  [phone] (home)   Additional Information:  Na

## 2020-04-24 ENCOUNTER — TELEPHONE (OUTPATIENT)
Dept: FAMILY MEDICINE | Facility: CLINIC | Age: 70
End: 2020-04-24

## 2020-04-24 NOTE — TELEPHONE ENCOUNTER
----- Message from Frederick Brown sent at 4/24/2020 12:20 PM CDT -----  Contact: same  Patient called in and stated she got a message earlier to call office but not sure what it is regarding?  Patient call back number is [phone]

## 2020-04-24 NOTE — TELEPHONE ENCOUNTER
Patient states she was returning a phone call she missed, looked through patient's chart did not see a reason for a call and no documentation. Informed patient that if we can figure out who called and why we will have them to give her a call back. Patient states understanding

## 2020-04-29 ENCOUNTER — OFFICE VISIT (OUTPATIENT)
Dept: FAMILY MEDICINE | Facility: CLINIC | Age: 70
End: 2020-04-29
Payer: MEDICARE

## 2020-04-29 DIAGNOSIS — I10 HYPERTENSION, UNSPECIFIED TYPE: ICD-10-CM

## 2020-04-29 DIAGNOSIS — E11.9 TYPE 2 DIABETES MELLITUS WITHOUT COMPLICATION, WITHOUT LONG-TERM CURRENT USE OF INSULIN: Primary | ICD-10-CM

## 2020-04-29 DIAGNOSIS — E79.0 HYPERURICEMIA: ICD-10-CM

## 2020-04-29 DIAGNOSIS — E03.9 HYPOTHYROIDISM, UNSPECIFIED TYPE: ICD-10-CM

## 2020-04-29 PROCEDURE — 3044F PR MOST RECENT HEMOGLOBIN A1C LEVEL <7.0%: ICD-10-PCS | Mod: CPTII,95,, | Performed by: FAMILY MEDICINE

## 2020-04-29 PROCEDURE — 1159F MED LIST DOCD IN RCRD: CPT | Mod: 95,,, | Performed by: FAMILY MEDICINE

## 2020-04-29 PROCEDURE — 99213 PR OFFICE/OUTPT VISIT, EST, LEVL III, 20-29 MIN: ICD-10-PCS | Mod: 95,,, | Performed by: FAMILY MEDICINE

## 2020-04-29 PROCEDURE — 1101F PR PT FALLS ASSESS DOC 0-1 FALLS W/OUT INJ PAST YR: ICD-10-PCS | Mod: CPTII,95,, | Performed by: FAMILY MEDICINE

## 2020-04-29 PROCEDURE — 3044F HG A1C LEVEL LT 7.0%: CPT | Mod: CPTII,95,, | Performed by: FAMILY MEDICINE

## 2020-04-29 PROCEDURE — 1159F PR MEDICATION LIST DOCUMENTED IN MEDICAL RECORD: ICD-10-PCS | Mod: 95,,, | Performed by: FAMILY MEDICINE

## 2020-04-29 PROCEDURE — 99213 OFFICE O/P EST LOW 20 MIN: CPT | Mod: 95,,, | Performed by: FAMILY MEDICINE

## 2020-04-29 PROCEDURE — 1101F PT FALLS ASSESS-DOCD LE1/YR: CPT | Mod: CPTII,95,, | Performed by: FAMILY MEDICINE

## 2020-04-29 NOTE — PROGRESS NOTES
CC: diabetes    HPI:    Here for 3 month f/u on chronic health issues    The patient location is: home  The chief complaint leading to consultation is: diabetes  Visit type: audiovisual  Total time spent with patient: 15 minutes  Each patient to whom he or she provides medical services by telemedicine is:  (1) informed of the relationship between the physician and patient and the respective role of any other health care provider with respect to management of the patient; and (2) notified that he or she may decline to receive medical services by telemedicine and may withdraw from such care at any time.    DM2 - tolerating Metformin 750mg 2 tabs qhs, Trulicity for the past 3 months  glimepride 1mg daily CAUSED SOME HYPOGLYCEMIA  BGs have been 100 . Last Hgb A1c was 6.3  GERD - controlled on Omeprazole 20mg daily  HTN - tolerating Avapro 150mg daily and Maxzide  Hypothyroidism - tolerating Levoxyl 25mcg  gout - tolerating allopurinol 200mg daily; No flares since her last appt.  Psoriasis - following with dermatology, Dr. Burciaga; using Cosentyx  HLD - taking Crestor 10mg  Vitamin d deficiency - taking vitamin D 2,000 IU daily    PAST MEDICAL HISTORY:  hypothyroidism   gastroesophageal reflux disease  h/o peptic ulcer  hypertension  fatty liver with hepatomegaly  DM2 (dx )  gout - 1st toe joint  depression with anxiety  RIGHT EYE CATRACT  psoriasis    PAST SURGICAL HISTORY:  abdominal hysterectomy  appendectomy   tonsillectomy  lap david   left knee arhtroscopy x 2  right hand CTS release  Right TKA    FAMILY HISTORY:  Father  at 79 of heart disease  Mother  87 of pneumonia  Sister  of an undiagnosed neurological disorder  Brother:  of lung cancer    SOCIAL HISTORY:  Tobacco use: 1/2 pack per day   Alcohol use:none  Ilicit drug use: none  Occupation:[employer]   Marital status:  son  at age 19 of electrocution  Children:2 (1  - rachael)    REVIEW OF  SYSTEMS:  GENERAL: No fever, chills, fatigability or weight changes.  SKIN/BREASTS: No rashes, sores, itching or changes in color or texture of skin. No changes in moles. No pain, swelling, tenderness, lumps, bleeding or discharge from nipples.  HEAD: No headaches or recent head trauma.   EYES: Visual acuity fine. Denies blurriness, tearing, itching, photophobia, diplopia, or visual changes.   EARS: Denies ear pain, discharge, tinnitus or vertigo. Denies hearing loss.   NOSE: No loss of smell, epistaxis, postnasal drip, discharge, obstruction, or sneezing.  MOUTH & THROAT: No hoarseness, change in voice, swallowing difficulty. No excessive gum bleeding.   HEMATOLOGICAL/NODES: Denies swollen glands. No bleeding or bruising.  CHEST: No HOLDER, cyanosis, wheezing, cough or sputum production.  CARDIOVASCULAR: Denies chest pain, dyspnea, orthopnea, or palpitations.  GI/ABDOMEN: Appetite fine. No weight loss. Denies nausea, vomiting, diarrhea, constipation, abdominal pain, hematemesis or blood in stool.  URINARY: No dysuria,hematuria, nocturia, incontinence, flank pain, urgency, or urinary difficulty.  PERIPHERAL VASCULAR: No claudication, cold intolerance or cyanosis.  MUSCULOSKELETAL: No joint stiffness, pain, swelling, or loss of strength. Denies back pain.  NEUROLOGIC: No history of seizures, paralysis, alteration of gait or coordination.  ENDOCRINE: Sexual maturation. Denies weight change, heat/cold intolerance, hair loss or gain, loss of libido, polyuria, polydipsia, polyphagia, pruritus, unexplained flushing, or excessive sweating.   PSYCH: No crying spells. Denies anxiety symptoms.    PHYSICAL EXAM:    APPEARANCE: Well nourished, well developed, neatly groomed, in no acute distress.   SKIN: Warm, moist and dry. No lesions or rashes.  EYES: Conjunctivae and lids clear. PERRL. EOMI.   NECK: Supple with full range of motion. No masses. No thyromegaly. No JVD or bruits.  RESPIRATORY: Easy and unlabored respirations.    EXTREMITIES: No edema or cyanosis. Pedal pulses 2+ bilaterally. No varicosities   PSYCH: Awake, alert, oriented to person, place, time, and situation. Pleasant and cooperative mood with intact judgment.    Results for orders placed or performed in visit on 01/29/20   Urinalysis, Reflex to Urine Culture Urine, Clean Catch   Result Value Ref Range    Specimen UA Urine, Clean Catch     Color, UA Yellow Yellow, Straw, Kimberley    Appearance, UA Clear Clear    pH, UA 6.0 5.0 - 8.0    Specific Gravity, UA 1.020 1.005 - 1.030    Protein, UA Negative Negative    Glucose, UA Negative Negative    Ketones, UA Negative Negative    Bilirubin (UA) Negative Negative    Occult Blood UA Negative Negative    Nitrite, UA Negative Negative    Leukocytes, UA Negative Negative   POCT URINE DIPSTICK WITHOUT MICROSCOPE   Result Value Ref Range    Color, UA light yellow     Spec Grav UA 1.010     pH, UA 6     WBC, UA trace     Nitrite, UA negative     Protein negative     Glucose, UA normal     Ketones, UA negative     Urobilinogen, UA normal     Bilirubin negative     Blood, UA negative          ASSESSMENT/PLAN:  Type 2 diabetes mellitus without complication, without long-term current use of insulin    Hypertension, unspecified type    Hyperuricemia    Hypothyroidism, unspecified type       Get labs as planned at [facility]; we will call with results and further advice  1. DM2 - continue Metformin 750mg BID; reemphasized diet and exercise; trulicity  2. HTN (controlled) - continue Avapro 150mg daily and Maxzide  3. gout - allopurinol 200mg daily  4. hypothyroidism - continue Levothyroxine 25mcg daily  5. GERD (controlled) - continue Prilosec 20mg daily  5. HLD - crestor 10mg daily  F/u 3 months

## 2020-05-06 DIAGNOSIS — I10 HTN (HYPERTENSION): ICD-10-CM

## 2020-05-06 DIAGNOSIS — K21.9 GERD (GASTROESOPHAGEAL REFLUX DISEASE): ICD-10-CM

## 2020-05-06 DIAGNOSIS — L71.9 ROSACEA: ICD-10-CM

## 2020-05-06 DIAGNOSIS — E78.5 HYPERLIPIDEMIA, UNSPECIFIED HYPERLIPIDEMIA TYPE: ICD-10-CM

## 2020-05-06 DIAGNOSIS — E03.9 HYPOTHYROIDISM, UNSPECIFIED TYPE: Primary | ICD-10-CM

## 2020-05-06 DIAGNOSIS — E79.0 HYPERURICEMIA: ICD-10-CM

## 2020-05-06 RX ORDER — ZOLPIDEM TARTRATE 10 MG/1
10 TABLET ORAL NIGHTLY PRN
Qty: 30 TABLET | Refills: 5 | Status: CANCELLED | OUTPATIENT
Start: 2020-05-06

## 2020-05-06 RX ORDER — METRONIDAZOLE 7.5 MG/G
CREAM TOPICAL 2 TIMES DAILY
Qty: 45 G | Refills: 5 | Status: CANCELLED | OUTPATIENT
Start: 2020-05-06 | End: 2021-05-06

## 2020-05-06 NOTE — TELEPHONE ENCOUNTER
----- Message from Yudith Butterfield MA sent at 5/6/2020  8:44 AM CDT -----  Contact: self/[phone]  Refill Request  Medication: zolpidem (AMBIEN) 10 mg Tab  triamterene-hydrochlorothiazide 75-50 mg (MAXZIDE) 75-50 mg per tablet   rosuvastatin (CRESTOR) 10 MG tablet   omeprazole (PRILOSEC) 20 MG capsule  levothyroxine (SYNTHROID) 25 MCG tablet   metronidazole 0.75% (METROCREAM) 0.75 % Crea  irbesartan (AVAPRO) 150 MG tablet  allopurinol (ZYLOPRIM) 300 MG tablet  Pharmacy and Phone: [facility] - [place] [address] [phone] (Phone)

## 2020-05-07 DIAGNOSIS — L71.9 ROSACEA: ICD-10-CM

## 2020-05-07 RX ORDER — OMEPRAZOLE 20 MG/1
20 CAPSULE, DELAYED RELEASE ORAL DAILY
Qty: 90 CAPSULE | Refills: 2 | Status: SHIPPED | OUTPATIENT
Start: 2020-05-07 | End: 2020-09-08 | Stop reason: SDUPTHER

## 2020-05-07 RX ORDER — ALLOPURINOL 300 MG/1
300 TABLET ORAL DAILY
Qty: 90 TABLET | Refills: 2 | Status: SHIPPED | OUTPATIENT
Start: 2020-05-07 | End: 2020-09-08 | Stop reason: SDUPTHER

## 2020-05-07 RX ORDER — METRONIDAZOLE 7.5 MG/G
CREAM TOPICAL 2 TIMES DAILY
Qty: 45 G | Refills: 5 | Status: SHIPPED | OUTPATIENT
Start: 2020-05-07 | End: 2020-09-08 | Stop reason: SDUPTHER

## 2020-05-07 RX ORDER — TRIAMTERENE AND HYDROCHLOROTHIAZIDE 75; 50 MG/1; MG/1
1 TABLET ORAL DAILY
Qty: 90 TABLET | Refills: 2 | Status: SHIPPED | OUTPATIENT
Start: 2020-05-07 | End: 2020-09-08 | Stop reason: SDUPTHER

## 2020-05-07 RX ORDER — ROSUVASTATIN CALCIUM 10 MG/1
10 TABLET, COATED ORAL DAILY
Qty: 90 TABLET | Refills: 2 | Status: SHIPPED | OUTPATIENT
Start: 2020-05-07 | End: 2020-09-08 | Stop reason: SDUPTHER

## 2020-05-07 RX ORDER — IRBESARTAN 150 MG/1
150 TABLET ORAL NIGHTLY
Qty: 90 TABLET | Refills: 2 | Status: SHIPPED | OUTPATIENT
Start: 2020-05-07 | End: 2020-09-08 | Stop reason: SDUPTHER

## 2020-05-07 RX ORDER — ZOLPIDEM TARTRATE 10 MG/1
10 TABLET ORAL NIGHTLY PRN
Qty: 30 TABLET | Refills: 5 | Status: SHIPPED | OUTPATIENT
Start: 2020-05-07 | End: 2020-09-08 | Stop reason: SDUPTHER

## 2020-05-07 RX ORDER — LEVOTHYROXINE SODIUM 25 UG/1
25 TABLET ORAL DAILY
Qty: 90 TABLET | Refills: 2 | Status: SHIPPED | OUTPATIENT
Start: 2020-05-07 | End: 2020-09-08 | Stop reason: SDUPTHER

## 2020-05-07 NOTE — PROGRESS NOTES
Refill Authorization Note    is requesting a refill authorization.    Brief assessment and rationale for refill: DEFER; not previously written by PCP// APPROVE: bridget (crestor)      Medication-related problems identified:   Dose adjustment  Drug-drug interaction    Medication Therapy Plan: addressed DDis per adherence data and provider notes; pended current therapy regiments per recent encounter notes;  PLEASE REVIEW ALLOPURINOL IG< increased to 300mg then stated as taking 200mg  Pt recently established care, not written by pcp, DEFER     Medication reconciliation completed: No                         Comments:      Automatic Epic Protocol Generated Data:    Requested Prescriptions   Pending Prescriptions Disp Refills    allopurinoL (ZYLOPRIM) 300 MG tablet 90 tablet 2     Sig: Take 1 tablet (300 mg total) by mouth once daily.       Endocrinology:  Gout Agents - allopurinol Failed - 5/7/2020 10:33 AM        Failed - Uric Acid is 5.9 or below and within 360 days     Uric Acid   Date Value Ref Range Status   01/25/2020 7.7 (H) 2.5 - 7.0 mg/dL Final     Comment:     Therapeutic target for gout patients: <6.0 mg/dL         09/10/2011 6.2 (H) 2.4 - 5.7 mg/dl Final   06/04/2011 9.1 (H) 2.4 - 5.7 mg/dl Final              Passed - Patient is at least 18 years old        Passed - Office visit in past 12 months or future 90 days.     Recent Outpatient Visits            1 week ago Type 2 diabetes mellitus without complication, without long-term current use of insulin    [facility] Crow Rahman MD    2 weeks ago Dysuria    [facility] Fani Velazco, DNP, APRN    3 months ago Type 2 diabetes mellitus without complication, without long-term current use of insulin    [facility] Crow Rahman MD    4 months ago Preventative health care    [facility] Crow Rahman MD    5 years ago UTI (lower urinary tract infection)    [facility] Crow Rahman MD                    Passed - Cr is 1.3 or below and within 360 days     Creatinine   Date Value Ref Range Status   01/25/2020 1.01 (H) 0.50 - 0.99 mg/dL Final     Comment:     For patients >49 years of age, the reference limit  for Creatinine is approximately 13% higher for people  identified as -American.        09/10/2011 0.7 0.5 - 1.4 mg/dl Final   06/04/2011 0.8 0.5 - 1.4 mg/dl Final              Passed - WBC in normal range and within 360 days     WBC   Date Value Ref Range Status   01/25/2020 7.3 3.8 - 10.8 Thousand/uL Final   01/17/2006 8.64 4.8 - 10.8 K/uL Final              Passed - RBC in normal range and within 360 days     RBC   Date Value Ref Range Status   01/25/2020 4.38 3.80 - 5.10 Million/uL Final   01/17/2006 4.96 4.00 - 5.40 M/uL Final              Passed - HGB in normal range and within 360 days     Hemoglobin   Date Value Ref Range Status   01/25/2020 12.8 11.7 - 15.5 g/dL Final   01/17/2006 14.6 12.0 - 16.0 gm/dl Final              Passed - HCT in normal range and within 360 days     Hematocrit   Date Value Ref Range Status   01/25/2020 37.2 35.0 - 45.0 % Final   01/17/2006 43.4 37.0 - 48.5 % Final              Passed - PLT in normal range and within 360 days     Platelets   Date Value Ref Range Status   01/25/2020 304 140 - 400 Thousand/uL Final   01/17/2006 256 150 - 350 K/uL Final              Passed - ALT is 94 or below and within 360 days     ALT   Date Value Ref Range Status   01/25/2020 24 6 - 29 U/L Final   09/10/2011 23 10 - 44 U/L Final   06/04/2011 23 10 - 44 U/L Final              Passed - AST is 54 or below and within 360 days     AST   Date Value Ref Range Status   01/25/2020 17 10 - 35 U/L Final   09/10/2011 18 10 - 40 U/L Final   06/04/2011 18 10 - 40 U/L Final              Passed - eGFR is 60 or above and within 360 days     eGFR if non    Date Value Ref Range Status   01/25/2020 58 (L) > OR = 60 mL/min/1.73m2 Final    09/10/2011 >60 >60 mL/min Final   06/04/2011 >60 >60 mL/min Final     eGFR if    Date Value Ref Range Status   01/25/2020 67 > OR = 60 mL/min/1.73m2 Final   09/10/2011 >60 >60 mL/min Final     Comment:     Estimated glomerular filtration rate (eGFR) is normalized to an  average body surface area of 1.73 square meters.  The calculation  used to obtain the eGFR is the adjusted MDRD equation, which factors  patient sex, age, race, and creatinine result.  Since race is unknown  in our information system, the eGFR values for -American  and Non--American patients are given for each creatinine  result.   06/04/2011 >60 >60 mL/min Final     Comment:     Estimated glomerular filtration rate (eGFR) is normalized to an  average body surface area of 1.73 square meters.  The calculation  used to obtain the eGFR is the adjusted MDRD equation, which factors  patient sex, age, race, and creatinine result.  Since race is unknown  in our information system, the eGFR values for -American  and Non--American patients are given for each creatinine  result.             triamterene-hydrochlorothiazide 75-50 mg (MAXZIDE) 75-50 mg per tablet 90 tablet 2     Sig: Take 1 tablet by mouth once daily.       Cardiovascular: Diuretic Combos Passed - 5/7/2020 10:33 AM        Passed - Patient is at least 18 years old        Passed - Last BP in normal range within 360 days.     BP Readings from Last 3 Encounters:   02/10/20 122/67   01/29/20 118/76   01/08/20 126/68              Passed - Office visit in past 12 months or future 90 days.     Recent Outpatient Visits            1 week ago Type 2 diabetes mellitus without complication, without long-term current use of insulin    [facility] Crow Rahman MD    2 weeks ago Dysuria    [facility] Fani Velazco, VIKI, APRN    3 months ago Type 2 diabetes mellitus without complication, without long-term current use of insulin     [facility] Crow Rahman MD    4 months ago Preventative health care    [facility] Crow Rahman MD    5 years ago UTI (lower urinary tract infection)    [facility] Crow Rahman MD                    Passed - K in normal range and within 180 days     Potassium   Date Value Ref Range Status   01/25/2020 3.9 3.5 - 5.3 mmol/L Final   09/10/2011 3.6 3.5 - 5.1 mmol/L Final   06/04/2011 3.8 3.5 - 5.1 mmol/L Final              Passed - Na is between 130 and 148 and within 180 days     Sodium   Date Value Ref Range Status   01/25/2020 139 135 - 146 mmol/L Final   09/10/2011 140 136 - 145 mmol/L Final   06/04/2011 139 136 - 145 mmol/L Final              Passed - Cr is 1.3 or below and within 180 days     Creatinine   Date Value Ref Range Status   01/25/2020 1.01 (H) 0.50 - 0.99 mg/dL Final     Comment:     For patients >49 years of age, the reference limit  for Creatinine is approximately 13% higher for people  identified as -American.        09/10/2011 0.7 0.5 - 1.4 mg/dl Final   06/04/2011 0.8 0.5 - 1.4 mg/dl Final              Passed - Ca in normal range and within 360 days     Calcium   Date Value Ref Range Status   01/25/2020 9.7 8.6 - 10.4 mg/dL Final   09/10/2011 9.6 8.7 - 10.5 mg/dl Final   06/04/2011 9.7 8.7 - 10.5 mg/dl Final              Passed - eGFR within 180 days     eGFR if non    Date Value Ref Range Status   01/25/2020 58 (L) > OR = 60 mL/min/1.73m2 Final   09/10/2011 >60 >60 mL/min Final   06/04/2011 >60 >60 mL/min Final     eGFR if    Date Value Ref Range Status   01/25/2020 67 > OR = 60 mL/min/1.73m2 Final   09/10/2011 >60 >60 mL/min Final     Comment:     Estimated glomerular filtration rate (eGFR) is normalized to an  average body surface area of 1.73 square meters.  The calculation  used to obtain the eGFR is the adjusted MDRD equation, which factors  patient sex, age, race, and creatinine  result.  Since race is unknown  in our information system, the eGFR values for -American  and Non--American patients are given for each creatinine  result.   06/04/2011 >60 >60 mL/min Final     Comment:     Estimated glomerular filtration rate (eGFR) is normalized to an  average body surface area of 1.73 square meters.  The calculation  used to obtain the eGFR is the adjusted MDRD equation, which factors  patient sex, age, race, and creatinine result.  Since race is unknown  in our information system, the eGFR values for -American  and Non--American patients are given for each creatinine  result.              Powered by [employer] - 5/7/2020 10:33 AM        The requested medication is not on the active medication list.       omeprazole (PRILOSEC) 20 MG capsule 90 capsule 2     Sig: Take 1 capsule (20 mg total) by mouth once daily.       Gastroenterology: Proton Pump Inhibitors Passed - 5/7/2020 10:33 AM        Passed - Patient is at least 18 years old        Passed - Osteoporosis is not on problem list        Passed - Plavix is not on active medication list        Passed - Office visit in past 6 months or future 90 days.     Recent Outpatient Visits            1 week ago Type 2 diabetes mellitus without complication, without long-term current use of insulin    [facility]    2 weeks ago Dysuria    [facility] Fani Velazco, DNP, APRN    3 months ago Type 2 diabetes mellitus without complication, without long-term current use of insulin    [facility]    4 months ago Preventative health care    [facility]    5 years ago UTI (lower urinary tract infection)    [facility] Varinder Rahman MD                    Passed - GERD is on problem list        Powered by [employer] - 5/7/2020 10:33 AM        The requested medication is not on the  active medication list.       levothyroxine (SYNTHROID) 25 MCG tablet 90 tablet 2     Sig: Take 1 tablet (25 mcg total) by mouth once daily.       Endocrinology:  Hypothyroid Agents Failed - 5/7/2020 10:33 AM        Failed - Manual Review: FT4 is not required if last TSH is WNL.        Failed - T4 free within 1080 days     No results found for: EXTFREET4, T4FREE, FREET4, T7BEWAHHSHPG, T4FT4           Passed - Patient is at least 18 years old        Passed - Office visit in past 12 months or future 90 days.     Recent Outpatient Visits            1 week ago Type 2 diabetes mellitus without complication, without long-term current use of insulin    [facility] Crow Rahman MD    2 weeks ago Dysuria    [facility] Fani Velazco, DNP, APRN    3 months ago Type 2 diabetes mellitus without complication, without long-term current use of insulin    [facility] Crow Rahman MD    4 months ago Preventative health care    [facility] Crow Rahman MD    5 years ago UTI (lower urinary tract infection)    [facility] Crow Rahman MD                    Passed - TSH in normal range and within 360 days     TSH   Date Value Ref Range Status   01/25/2020 1.96 0.40 - 4.50 mIU/L Final   06/04/2011 1.07 0.4 - 4.0 uIU/ml Final              Powered by [employer] - 5/7/2020 10:33 AM        The requested medication is not on the active medication list.       irbesartan (AVAPRO) 150 MG tablet 90 tablet 2     Sig: Take 1 tablet (150 mg total) by mouth every evening.       Cardiovascular:  Angiotensin Receptor Blockers Passed - 5/7/2020 10:33 AM        Passed - Patient is at least 18 years old        Passed - Last BP in normal range within 360 days.     BP Readings from Last 3 Encounters:   02/10/20 122/67   01/29/20 118/76   01/08/20 126/68              Passed - Office visit in past 12 months or future 90 days.     Recent Outpatient  Visits            1 week ago Type 2 diabetes mellitus without complication, without long-term current use of insulin    [facility] Crow Rahman MD    2 weeks ago Dysuria    [facility] Fani Velazco, DNP, APRN    3 months ago Type 2 diabetes mellitus without complication, without long-term current use of insulin    [facility]    4 months ago Preventative health care    [facility]    5 years ago UTI (lower urinary tract infection)    [facility]                    Passed - Cr is 1.3 or below and within 360 days     Creatinine   Date Value Ref Range Status   01/25/2020 1.01 (H) 0.50 - 0.99 mg/dL Final     Comment:     For patients >49 years of age, the reference limit  for Creatinine is approximately 13% higher for people  identified as -American.        09/10/2011 0.7 0.5 - 1.4 mg/dl Final   06/04/2011 0.8 0.5 - 1.4 mg/dl Final              Passed - K in normal range and within 360 days     Potassium   Date Value Ref Range Status   01/25/2020 3.9 3.5 - 5.3 mmol/L Final   09/10/2011 3.6 3.5 - 5.1 mmol/L Final   06/04/2011 3.8 3.5 - 5.1 mmol/L Final              Passed - eGFR within 360 days     eGFR if non    Date Value Ref Range Status   01/25/2020 58 (L) > OR = 60 mL/min/1.73m2 Final   09/10/2011 >60 >60 mL/min Final   06/04/2011 >60 >60 mL/min Final     eGFR if    Date Value Ref Range Status   01/25/2020 67 > OR = 60 mL/min/1.73m2 Final   09/10/2011 >60 >60 mL/min Final     Comment:     Estimated glomerular filtration rate (eGFR) is normalized to an  average body surface area of 1.73 square meters.  The calculation  used to obtain the eGFR is the adjusted MDRD equation, which factors  patient sex, age, race, and creatinine result.  Since race is unknown  in our information system, the eGFR values for  -American  and Non--American patients are given for each creatinine  result.   06/04/2011 >60 >60 mL/min Final     Comment:     Estimated glomerular filtration rate (eGFR) is normalized to an  average body surface area of 1.73 square meters.  The calculation  used to obtain the eGFR is the adjusted MDRD equation, which factors  patient sex, age, race, and creatinine result.  Since race is unknown  in our information system, the eGFR values for -American  and Non--American patients are given for each creatinine  result.              Powered by [employer] - 5/7/2020 10:33 AM        The requested medication is not on the active medication list.      Signed Prescriptions Disp Refills    rosuvastatin (CRESTOR) 10 MG tablet 90 tablet 2     Sig: Take 1 tablet (10 mg total) by mouth once daily.       Cardiovascular:  Antilipid - Statins Passed - 5/7/2020 10:33 AM        Passed - Patient is at least 18 years old        Passed - Office visit in past 12 months or future 90 days.     Recent Outpatient Visits            1 week ago Type 2 diabetes mellitus without complication, without long-term current use of insulin    [facility] Crow Rahman MD    2 weeks ago Dysuria    [facility] Fani Velazco, DNP, APRN    3 months ago Type 2 diabetes mellitus without complication, without long-term current use of insulin    [facility] Crow Rahman MD    4 months ago Preventative health care    [facility] Varinder Rahman MD    5 years ago UTI (lower urinary tract infection)    [facility] Crow Rahman MD                    Passed - Lipid Panel completed in last 360 days     Lab Results   Component Value Date    CHOL 189 01/25/2020    HDL 55 01/25/2020    LDLCALC 102 (H) 01/25/2020    TRIG 201 (H) 01/25/2020             Passed - ALT is 94 or below and within 360 days     ALT   Date Value Ref Range Status    01/25/2020 24 6 - 29 U/L Final   09/10/2011 23 10 - 44 U/L Final   06/04/2011 23 10 - 44 U/L Final              Passed - AST is 54 or below and within 360 days     AST   Date Value Ref Range Status   01/25/2020 17 10 - 35 U/L Final   09/10/2011 18 10 - 40 U/L Final   06/04/2011 18 10 - 40 U/L Final                 Appointments  past 12m or future 3m with PCP    Date Provider   Last Visit   4/29/2020 Crow Rahman MD   Next Visit   5/7/2020 Crow Rahman MD   ED visits in past 90 days: 0     Note composed:10:37 AM 05/07/2020

## 2020-05-07 NOTE — TELEPHONE ENCOUNTER
Routing:   Medication: zolpidem (AMBIEN) 10 mg Tab  metronidazole 0.75% (METROCREAM) 0.75 % Crea      All others will be addressed in separate encounter:   Associated Reports   View Encounter        Rx Request     Kina Arteaga LPN routed conversation to Centralized Refill Staff Pool Yesterday (8:54 AM)      Kina Arteaga LPN Yesterday (8:52 AM)         ----- Message from Yudith Butterfield MA sent at 5/6/2020  8:44 AM CDT -----  Contact: self/[phone]  Refill Request  Medication: zolpidem (AMBIEN) 10 mg Tab  triamterene-hydrochlorothiazide 75-50 mg (MAXZIDE) 75-50 mg per tablet   rosuvastatin (CRESTOR) 10 MG tablet   omeprazole (PRILOSEC) 20 MG capsule  levothyroxine (SYNTHROID) 25 MCG tablet   metronidazole 0.75% (METROCREAM) 0.75 % Crea  irbesartan (AVAPRO) 150 MG tablet  allopurinol (ZYLOPRIM) 300 MG tablet  Pharmacy and Phone: [facility] [id number] [place] [address] [phone] (Phone)                         Documentation                 Note composed:9:51 AM 05/07/2020

## 2020-05-07 NOTE — PROGRESS NOTES
Refill Routing Note    Medication(s) are not appropriate for processing by [facility]:       Outside of protocol and Medication not previously prescribed by PCP           Medication reconciliation completed: No        Automatic Epic Protocol Generated Data:    Requested Prescriptions   Pending Prescriptions Disp Refills    metronidazole 0.75% (METROCREAM) 0.75 % Crea 45 g 5     Si (two) times daily.       Off-Protocol Failed - 2020  9:53 AM        Failed - Medication not assigned to a protocol, review manually.        Passed - Office visit in past 12 months or future 90 days.     Recent Outpatient Visits            1 week ago Type 2 diabetes mellitus without complication, without long-term current use of insulin    [facility] Crow Rahman MD    2 weeks ago Dysuria    [facility] Fani Velazco, DNP, APRN    3 months ago Type 2 diabetes mellitus without complication, without long-term current use of insulin    [facility] Crow Rahman MD    4 months ago Preventative health care    [facility] Crow Rahman MD    5 years ago UTI (lower urinary tract infection)    [facility] Crow Rahman MD                    Powered by [employer] - 2020  9:53 AM        The requested medication is not on the active medication list.       zolpidem (AMBIEN) 10 mg Tab 30 tablet 5     Sig: Take 1 tablet (10 mg total) by mouth nightly as needed.       There is no refill protocol information for this order           Appointments [id number] or future 3m with PCP    Date Provider   Last Visit   2020 Crow Rahman MD   Next Visit   Visit date not found Crow Rahman MD   ED visits in past 90 days: 0     Note composed:9:53 AM 2020

## 2020-07-15 ENCOUNTER — TELEPHONE (OUTPATIENT)
Dept: FAMILY MEDICINE | Facility: CLINIC | Age: 70
End: 2020-07-15

## 2020-07-15 NOTE — TELEPHONE ENCOUNTER
----- Message from Zabrina Lee sent at 7/14/2020  4:38 PM CDT -----  Contact: self  Type: Needs Medical Advice  Who Called:  patient  Best Call Back Number: [phone] (home)   Additional Information: Requesting you mail her another copy of orders for her labs, (she lost the paper copy you gave her).  Thanks

## 2020-08-19 LAB
25(OH)D3 SERPL-MCNC: 69 NG/ML (ref 30–100)
ALBUMIN SERPL-MCNC: 4.3 G/DL (ref 3.6–5.1)
ALBUMIN/CREAT UR: 35 MCG/MG CREAT
ALBUMIN/GLOB SERPL: 1.4 (CALC) (ref 1–2.5)
ALP SERPL-CCNC: 56 U/L (ref 37–153)
ALT SERPL-CCNC: 19 U/L (ref 6–29)
AST SERPL-CCNC: 20 U/L (ref 10–35)
BASOPHILS # BLD AUTO: 76 CELLS/UL (ref 0–200)
BASOPHILS NFR BLD AUTO: 0.8 %
BILIRUB SERPL-MCNC: 0.5 MG/DL (ref 0.2–1.2)
BUN SERPL-MCNC: 24 MG/DL (ref 7–25)
BUN/CREAT SERPL: ABNORMAL (CALC) (ref 6–22)
CALCIUM SERPL-MCNC: 11 MG/DL (ref 8.6–10.4)
CHLORIDE SERPL-SCNC: 100 MMOL/L (ref 98–110)
CHOLEST SERPL-MCNC: 115 MG/DL
CHOLEST/HDLC SERPL: 2 (CALC)
CO2 SERPL-SCNC: 28 MMOL/L (ref 20–32)
CREAT SERPL-MCNC: 0.91 MG/DL (ref 0.5–0.99)
CREAT UR-MCNC: 193 MG/DL (ref 20–275)
EOSINOPHIL # BLD AUTO: 323 CELLS/UL (ref 15–500)
EOSINOPHIL NFR BLD AUTO: 3.4 %
ERYTHROCYTE [DISTWIDTH] IN BLOOD BY AUTOMATED COUNT: 13.6 % (ref 11–15)
GFRSERPLBLD MDRD-ARVRAT: 65 ML/MIN/1.73M2
GLOBULIN SER CALC-MCNC: 3 G/DL (CALC) (ref 1.9–3.7)
GLUCOSE SERPL-MCNC: 93 MG/DL (ref 65–99)
HBA1C MFR BLD: 5.4 % OF TOTAL HGB
HCT VFR BLD AUTO: 43.5 % (ref 35–45)
HCV AB S/CO SERPL IA: 0.01
HCV AB SERPL QL IA: NORMAL
HDLC SERPL-MCNC: 57 MG/DL
HGB BLD-MCNC: 14 G/DL (ref 11.7–15.5)
LDLC SERPL CALC-MCNC: 32 MG/DL (CALC)
LYMPHOCYTES # BLD AUTO: 2812 CELLS/UL (ref 850–3900)
LYMPHOCYTES NFR BLD AUTO: 29.6 %
MAGNESIUM SERPL-MCNC: 1.1 MG/DL (ref 1.5–2.5)
MCH RBC QN AUTO: 28.7 PG (ref 27–33)
MCHC RBC AUTO-ENTMCNC: 32.2 G/DL (ref 32–36)
MCV RBC AUTO: 89.3 FL (ref 80–100)
MICROALBUMIN UR-MCNC: 6.7 MG/DL
MONOCYTES # BLD AUTO: 599 CELLS/UL (ref 200–950)
MONOCYTES NFR BLD AUTO: 6.3 %
NEUTROPHILS # BLD AUTO: 5691 CELLS/UL (ref 1500–7800)
NEUTROPHILS NFR BLD AUTO: 59.9 %
NONHDLC SERPL-MCNC: 58 MG/DL (CALC)
PLATELET # BLD AUTO: 318 THOUSAND/UL (ref 140–400)
PMV BLD REES-ECKER: 12 FL (ref 7.5–12.5)
POTASSIUM SERPL-SCNC: 4.1 MMOL/L (ref 3.5–5.3)
PROT SERPL-MCNC: 7.3 G/DL (ref 6.1–8.1)
RBC # BLD AUTO: 4.87 MILLION/UL (ref 3.8–5.1)
SODIUM SERPL-SCNC: 139 MMOL/L (ref 135–146)
TRIGL SERPL-MCNC: 189 MG/DL
TSH SERPL-ACNC: 1.17 MIU/L (ref 0.4–4.5)
URATE SERPL-MCNC: 5.9 MG/DL (ref 2.5–7)
WBC # BLD AUTO: 9.5 THOUSAND/UL (ref 3.8–10.8)

## 2020-08-21 ENCOUNTER — TELEPHONE (OUTPATIENT)
Dept: FAMILY MEDICINE | Facility: CLINIC | Age: 70
End: 2020-08-21

## 2020-08-21 NOTE — TELEPHONE ENCOUNTER
----- Message from French Mohit sent at 8/20/2020  2:58 PM CDT -----  Type: Needs Medical Advice  Who Called:  Patient    Pharmacy name and phone #:    [facility] - [address]  [address]  [place] [address]  Phone: [phone] Fax: [phone]      Best Call Back Number: [phone]  Additional Information: Patient states that she would like a callback regarding whether her UTI medication has been called in.  Not sure of the name of medication

## 2020-08-21 NOTE — TELEPHONE ENCOUNTER
Spoke to pt to offer an appt with an available provider. Pt stated already took care of that I [facility] to the [facility]. Advised pt to let us know if we can be any further assistance. Pt verbalized understanding.

## 2020-08-21 NOTE — TELEPHONE ENCOUNTER
----- Message from Tere Patel sent at 8/20/2020 12:18 PM CDT -----  Type: Needs Medical Advice  Who Called:  Patient   Symptoms (please be specific):  Bladder infection   How long has patient had these symptoms:  3 weeks ago  Pharmacy name and phone #:    [facility] - [place] - [address]  [address] [address] [address]  Phone: [phone] Fax: [phone]  Best Call Back Number: [phone]  Additional Information: Please advise-thank you

## 2020-09-05 ENCOUNTER — TELEPHONE (OUTPATIENT)
Dept: FAMILY MEDICINE | Facility: CLINIC | Age: 70
End: 2020-09-05

## 2020-09-05 NOTE — TELEPHONE ENCOUNTER
----- Message from Princess RANDI Melendez sent at 9/4/2020  4:42 PM CDT -----  Contact: pt  Type:  Patient Returning Call    Who Called:  patient   Who Left Message for Patient:  Nurse Laina SWANSON  Does the patient know what this is regarding?:  yes  Best Call Back Number:  [phone] (home)     Additional Information:

## 2020-09-08 ENCOUNTER — TELEPHONE (OUTPATIENT)
Dept: FAMILY MEDICINE | Facility: CLINIC | Age: 70
End: 2020-09-08

## 2020-09-08 NOTE — TELEPHONE ENCOUNTER
----- Message from Kimberlee Guidry sent at 9/8/2020  9:09 AM CDT -----  Contact: [phone]  Patient is requesting a call back from the nurse to get her lab results. Please call and advise

## 2020-09-09 NOTE — TELEPHONE ENCOUNTER
Only abnormality was low magnesium  I would advise she begin an OTC magnesium supplement.  Hgb A1c was 5.4 (excellent)  Continue other present meds

## 2020-09-09 NOTE — TELEPHONE ENCOUNTER
Spoke to pt, pt stated she already talked to someone regarding her lab results. Pt would like to know what mg of OTC magnesium you want her to take, how often and should she take it in the am or pm.

## 2020-09-11 DIAGNOSIS — E11.9 TYPE 2 DIABETES MELLITUS WITHOUT COMPLICATION, UNSPECIFIED WHETHER LONG TERM INSULIN USE: ICD-10-CM

## 2020-10-02 ENCOUNTER — PATIENT OUTREACH (OUTPATIENT)
Dept: ADMINISTRATIVE | Facility: HOSPITAL | Age: 70
End: 2020-10-02

## 2020-10-02 NOTE — PROGRESS NOTES
Chart review completed 10/02/2020.  Care Everywhere updates requested and reviewed.  Immunizations reconciled. Media reports reviewed.  Duplicate HM overrides and  orders removed.  Overdue HM topic chart audit and/or requested.  Overdue lab testing linked to upcoming lab appointments if applies.        Health Maintenance Due   Topic Date Due    DEXA SCAN  1992    Shingles Vaccine (1 of 2) 2002    Eye Exam  2020    Influenza Vaccine (1) 2020

## 2020-10-16 ENCOUNTER — OFFICE VISIT (OUTPATIENT)
Dept: FAMILY MEDICINE | Facility: CLINIC | Age: 70
End: 2020-10-16
Payer: MEDICARE

## 2020-10-16 ENCOUNTER — TELEPHONE (OUTPATIENT)
Dept: FAMILY MEDICINE | Facility: CLINIC | Age: 70
End: 2020-10-16

## 2020-10-16 DIAGNOSIS — E11.9 TYPE 2 DIABETES MELLITUS WITHOUT COMPLICATION, WITHOUT LONG-TERM CURRENT USE OF INSULIN: ICD-10-CM

## 2020-10-16 DIAGNOSIS — I10 HYPERTENSION, UNSPECIFIED TYPE: ICD-10-CM

## 2020-10-16 DIAGNOSIS — E03.9 HYPOTHYROIDISM, UNSPECIFIED TYPE: ICD-10-CM

## 2020-10-16 DIAGNOSIS — F41.9 ANXIETY: ICD-10-CM

## 2020-10-16 DIAGNOSIS — N39.0 URINARY TRACT INFECTION WITHOUT HEMATURIA, SITE UNSPECIFIED: Primary | ICD-10-CM

## 2020-10-16 DIAGNOSIS — E78.5 HYPERLIPIDEMIA, UNSPECIFIED HYPERLIPIDEMIA TYPE: ICD-10-CM

## 2020-10-16 DIAGNOSIS — E79.0 HYPERURICEMIA: ICD-10-CM

## 2020-10-16 PROCEDURE — 99213 OFFICE O/P EST LOW 20 MIN: CPT | Mod: HCNC,S$GLB,, | Performed by: FAMILY MEDICINE

## 2020-10-16 PROCEDURE — 1159F PR MEDICATION LIST DOCUMENTED IN MEDICAL RECORD: ICD-10-PCS | Mod: HCNC,S$GLB,, | Performed by: FAMILY MEDICINE

## 2020-10-16 PROCEDURE — 99213 PR OFFICE/OUTPT VISIT, EST, LEVL III, 20-29 MIN: ICD-10-PCS | Mod: HCNC,S$GLB,, | Performed by: FAMILY MEDICINE

## 2020-10-16 PROCEDURE — 1159F MED LIST DOCD IN RCRD: CPT | Mod: HCNC,S$GLB,, | Performed by: FAMILY MEDICINE

## 2020-10-16 PROCEDURE — 99499 RISK ADDL DX/OHS AUDIT: ICD-10-PCS | Mod: S$GLB,,, | Performed by: FAMILY MEDICINE

## 2020-10-16 PROCEDURE — 99499 UNLISTED E&M SERVICE: CPT | Mod: S$GLB,,, | Performed by: FAMILY MEDICINE

## 2020-10-16 PROCEDURE — 3044F HG A1C LEVEL LT 7.0%: CPT | Mod: HCNC,CPTII,S$GLB, | Performed by: FAMILY MEDICINE

## 2020-10-16 PROCEDURE — 1101F PR PT FALLS ASSESS DOC 0-1 FALLS W/OUT INJ PAST YR: ICD-10-PCS | Mod: HCNC,CPTII,S$GLB, | Performed by: FAMILY MEDICINE

## 2020-10-16 PROCEDURE — 1101F PT FALLS ASSESS-DOCD LE1/YR: CPT | Mod: HCNC,CPTII,S$GLB, | Performed by: FAMILY MEDICINE

## 2020-10-16 PROCEDURE — 3044F PR MOST RECENT HEMOGLOBIN A1C LEVEL <7.0%: ICD-10-PCS | Mod: HCNC,CPTII,S$GLB, | Performed by: FAMILY MEDICINE

## 2020-10-16 RX ORDER — CIPROFLOXACIN 500 MG/1
500 TABLET ORAL 2 TIMES DAILY
Qty: 10 TABLET | Refills: 0 | Status: SHIPPED | OUTPATIENT
Start: 2020-10-16 | End: 2020-10-21

## 2020-10-16 RX ORDER — ALLOPURINOL 100 MG/1
100 TABLET ORAL 2 TIMES DAILY
Qty: 180 TABLET | Refills: 3 | Status: SHIPPED | OUTPATIENT
Start: 2020-10-16 | End: 2021-10-13

## 2020-10-16 RX ORDER — HYDROXYZINE PAMOATE 25 MG/1
25 CAPSULE ORAL EVERY 8 HOURS PRN
Qty: 30 CAPSULE | Refills: 2 | Status: SHIPPED | OUTPATIENT
Start: 2020-10-16

## 2020-10-16 NOTE — TELEPHONE ENCOUNTER
----- Message from Taiwo Mann sent at 10/16/2020  3:15 PM CDT -----  Regarding: Can't log in for her appmnt.  Type: Needs Medical Advice    Who Called:  Ptnt [phone]      Additional Information:     Advised unable to access [facility] for her 3:20 appmnt.

## 2020-10-16 NOTE — PROGRESS NOTES
CC: diabetes    HPI:    Here for 6 month f/u on chronic health issues    The patient location is: home  The chief complaint leading to consultation is: diabetes  Visit type: audiovisual on doximity  Total time spent with patient: 20 minutes  Each patient to whom he or she provides medical services by telemedicine is:  (1) informed of the relationship between the physician and patient and the respective role of any other health care provider with respect to management of the patient; and (2) notified that he or she may decline to receive medical services by telemedicine and may withdraw from such care at any time.    C/o some urinary frequency. No fever or blood in urine.    getting increased nervousness and requesting something to use PRN    DM2 - tolerating Metformin 750mg 2 tabs qhs, Trulicity   glimepride 1mg daily CAUSED SOME HYPOGLYCEMIA  BGs have been 100 .   GERD - controlled on Omeprazole 20mg daily  HTN - tolerating Avapro 150mg daily and Maxzide  Hypothyroidism - tolerating Levoxyl 25mcg  gout - tolerating allopurinol 200mg daily; No flares since her last appt. Could not tolerate 300mg dose.  Psoriasis - following with dermatology, Dr. Burciaga; using Cosentyx  HLD - taking Crestor 10mg  Vitamin d deficiency - taking vitamin D 2,000 IU daily    PAST MEDICAL HISTORY:  hypothyroidism   gastroesophageal reflux disease  h/o peptic ulcer  hypertension  fatty liver with hepatomegaly  DM2 (dx )  gout - 1st toe joint  depression with anxiety  RIGHT EYE CATRACT  psoriasis    PAST SURGICAL HISTORY:  abdominal hysterectomy  appendectomy   tonsillectomy  lap david   left knee arhtroscopy x 2  right hand CTS release  Right TKA    FAMILY HISTORY:  Father  at 79 of heart disease  Mother  87 of pneumonia  Sister  of an undiagnosed neurological disorder  Brother:  of lung cancer    SOCIAL HISTORY:  Tobacco use: 1/2 pack per day   Alcohol use:none  Ilicit drug use: none  Occupation:[employer]    Marital status:  son  at age 19 of electrocution  Children:2 (1  - rachael)    Current Outpatient Medications on File Prior to Visit   Medication Sig Dispense Refill    aspirin (ECOTRIN) 81 MG EC tablet Take 81 mg by mouth once daily.        azelastine (OPTIVAR) 0.05 % ophthalmic solution Place 1 drop into both eyes 2 (two) times daily. 6 mL 11    azelastine-fluticasone 137-50 mcg/spray [place] nassal spray 1 spray by Each Nare route 2 (two) times daily. 23 g 11    betamethasone valerate 0.1% (VALISONE) 0.1 % Crea Apply topically 2 (two) times daily. for 10 days 45 g 3    calcium-vitamin D3 (OS-MYNOR 500 + D3) 500 mg(1,250mg) -200 unit per tablet Take 1 tablet by mouth.      clobetasol (TEMOVATE) 0.05 % cream       COSENTYX PEN, 2 PENS, 150 mg/mL PnIj       diclofenac sodium (VOLTAREN) 1 % Gel Apply 2 g topically 4 (four) times daily as needed. 100 g 2    doxycycline (MONODOX) 100 MG capsule Take 1 capsule (100 mg total) by mouth 2 (two) times daily. (Patient not taking: Reported on 2/10/2020) 20 capsule 0    dulaglutide (TRULICITY) 0.75 mg/0.5 mL pen injector Inject 0.75 mg into the skin once a week. 4 pen 11    fluticasone propionate (FLONASE) 50 mcg/actuation nasal spray 1 spray (50 mcg total) by Each Nostril route once daily. 16 g 11    irbesartan (AVAPRO) 150 MG tablet Take 1 tablet (150 mg total) by mouth every evening. 90 tablet 3    levothyroxine (SYNTHROID) 25 MCG tablet Take 1 tablet (25 mcg total) by mouth once daily. 90 tablet 2    meloxicam (MOBIC) 15 MG tablet Take 1 tablet (15 mg total) by mouth daily as needed. 90 tablet 3    metFORMIN (GLUCOPHAGE-XR) 750 MG ER 24hr tablet Take 1 tablet (750 mg total) by mouth 2 (two) times daily with meals. 180 tablet 3    metronidazole 0.75% (METROCREAM) 0.75 % Crea Apply topically 2 (two) times daily. Please inactivate all prior scripts with same name and strength including any scripts on hold. 45 g 5     multivitamin capsule Take 1 capsule by mouth.      omeprazole (PRILOSEC) 20 MG capsule Take 1 capsule (20 mg total) by mouth once daily. 90 capsule 2    rosuvastatin (CRESTOR) 10 MG tablet Take 1 tablet (10 mg total) by mouth once daily. 90 tablet 3    triamterene-hydrochlorothiazide 75-50 mg (MAXZIDE) 75-50 mg per tablet Take 1 tablet by mouth once daily. 90 tablet 3    valacyclovir (VALTREX) 1000 MG tablet Take 1 tablet (1,000 mg total) by mouth 3 (three) times daily. 21 tablet 0    zolpidem (AMBIEN) 10 mg Tab Take 1 tablet (10 mg total) by mouth nightly as needed. 30 tablet 5     No current facility-administered medications on file prior to visit.          REVIEW OF SYSTEMS:  GENERAL: No fever, chills, fatigability or weight changes.  SKIN/BREASTS: No rashes, sores, itching or changes in color or texture of skin. No changes in moles. No pain, swelling, tenderness, lumps, bleeding or discharge from nipples.  HEAD: No headaches or recent head trauma.   EYES: Visual acuity fine. Denies blurriness, tearing, itching, photophobia, diplopia, or visual changes.   EARS: Denies ear pain, discharge, tinnitus or vertigo. Denies hearing loss.   NOSE: No loss of smell, epistaxis, postnasal drip, discharge, obstruction, or sneezing.  MOUTH & THROAT: No hoarseness, change in voice, swallowing difficulty. No excessive gum bleeding.   HEMATOLOGICAL/NODES: Denies swollen glands. No bleeding or bruising.  CHEST: No HOLDER, cyanosis, wheezing, cough or sputum production.  CARDIOVASCULAR: Denies chest pain, dyspnea, orthopnea, or palpitations.  GI/ABDOMEN: Appetite fine. No weight loss. Denies nausea, vomiting, diarrhea, constipation, abdominal pain, hematemesis or blood in stool.  URINARY: No dysuria,hematuria, nocturia, incontinence, flank pain, urgency, or urinary difficulty.  PERIPHERAL VASCULAR: No claudication, cold intolerance or cyanosis.  MUSCULOSKELETAL: No joint stiffness, pain, swelling, or loss of strength. Denies back  pain.  NEUROLOGIC: No history of seizures, paralysis, alteration of gait or coordination.  ENDOCRINE: Sexual maturation. Denies weight change, heat/cold intolerance, hair loss or gain, loss of libido, polyuria, polydipsia, polyphagia, pruritus, unexplained flushing, or excessive sweating.   PSYCH: No crying spells. Denies anxiety symptoms.    PHYSICAL EXAM:    APPEARANCE: Well nourished, well developed, neatly groomed, in no acute distress.   SKIN: Warm, moist and dry. No lesions or rashes.  EYES: Conjunctivae and lids clear. PERRL. EOMI.   NECK: Supple with full range of motion. No masses. No thyromegaly. No JVD or bruits.  RESPIRATORY: Easy and unlabored respirations.   EXTREMITIES: No edema or cyanosis. Pedal pulses 2+ bilaterally. No varicosities   PSYCH: Awake, alert, oriented to person, place, time, and situation. Pleasant and cooperative mood with intact judgment.    Results for orders placed or performed in visit on 08/18/20   Lipid Panel   Result Value Ref Range    Cholesterol 115 <200 mg/dL    HDL 57 > OR = 50 mg/dL    Triglycerides 189 (H) <150 mg/dL    LDL Cholesterol 32 mg/dL (calc)    Hdl/Cholesterol Ratio 2.0 <5.0 (calc)    Non HDL Chol. (LDL+VLDL) 58 <130 mg/dL (calc)   Microalbumin/creatinine urine ratio   Result Value Ref Range    Creatinine, Random Ur 193 20 - 275 mg/dL    Microalb, Ur 6.7 See Note: mg/dL    Microalb Creat Ratio 35 (H) <30 mcg/mg creat   Magnesium   Result Value Ref Range    Magnesium 1.1 (L) 1.5 - 2.5 mg/dL   Uric acid   Result Value Ref Range    Uric Acid 5.9 2.5 - 7.0 mg/dL   Comprehensive metabolic panel   Result Value Ref Range    Glucose 93 65 - 99 mg/dL    BUN, Bld 24 7 - 25 mg/dL    Creatinine 0.91 0.50 - 0.99 mg/dL    eGFR if non  65 > OR = 60 mL/min/1.73m2    eGFR if African American 76 > OR = 60 mL/min/1.73m2    BUN/Creatinine Ratio NOT APPLICABLE 6 - 22 (calc)    Sodium 139 135 - 146 mmol/L    Potassium 4.1 3.5 - 5.3 mmol/L    Chloride 100 98 - 110  mmol/L    CO2 28 20 - 32 mmol/L    Calcium 11.0 (H) 8.6 - 10.4 mg/dL    Total Protein 7.3 6.1 - 8.1 g/dL    Albumin 4.3 3.6 - 5.1 g/dL    Globulin, Total 3.0 1.9 - 3.7 g/dL (calc)    Albumin/Globulin Ratio 1.4 1.0 - 2.5 (calc)    Total Bilirubin 0.5 0.2 - 1.2 mg/dL    Alkaline Phosphatase 56 37 - 153 U/L    AST 20 10 - 35 U/L    ALT 19 6 - 29 U/L   CBC auto differential   Result Value Ref Range    WBC 9.5 3.8 - 10.8 Thousand/uL    RBC 4.87 3.80 - 5.10 Million/uL    Hemoglobin 14.0 11.7 - 15.5 g/dL    Hematocrit 43.5 35.0 - 45.0 %    Mean Corpuscular Volume 89.3 80.0 - 100.0 fL    Mean Corpuscular Hemoglobin 28.7 27.0 - 33.0 pg    Mean Corpuscular Hemoglobin Conc 32.2 32.0 - 36.0 g/dL    RDW 13.6 11.0 - 15.0 %    Platelets 318 140 - 400 Thousand/uL    MPV 12.0 7.5 - 12.5 fL    Neutrophils Absolute 5,691 1,500 - 7,800 cells/uL    Lymph # 2,812 850 - 3,900 cells/uL    Mono # 599 200 - 950 cells/uL    Eos # 323 15 - 500 cells/uL    Baso # 76 0 - 200 cells/uL    Neutrophils Relative 59.9 %    Lymph% 29.6 %    Mono% 6.3 %    Eosinophil% 3.4 %    Basophil% 0.8 %   Hepatitis C Antibody   Result Value Ref Range    Hepatitis C Ab NON-REACTIVE NON-REACTIVE    Signal/Cutoff 0.01 <1.00   TSH   Result Value Ref Range    TSH 1.17 0.40 - 4.50 mIU/L   Vitamin D   Result Value Ref Range    Vitamin D, 25-OH, Total 69 30 - 100 ng/mL   Hemoglobin A1C   Result Value Ref Range    Hemoglobin A1C 5.4 <5.7 % of total Hgb         ASSESSMENT/PLAN:  Urinary tract infection without hematuria, site unspecified  -     ciprofloxacin HCl (CIPRO) 500 MG tablet; Take 1 tablet (500 mg total) by mouth 2 (two) times daily. for 5 days  Dispense: 10 tablet; Refill: 0    Hyperuricemia  -     allopurinoL (ZYLOPRIM) 100 MG tablet; Take 1 tablet (100 mg total) by mouth 2 (two) times daily.  Dispense: 180 tablet; Refill: 3  -     Uric Acid; Future; Expected date: 04/14/2021    Type 2 diabetes mellitus without complication, without long-term current use of  insulin  -     Hemoglobin A1C; Future; Expected date: 04/14/2021  -     Comprehensive Metabolic Panel; Future; Expected date: 04/14/2021  -     Lipid Panel; Future; Expected date: 04/14/2021  -     Microalbumin/Creatinine Ratio, Urine; Future; Expected date: 04/14/2021    Hypertension, unspecified type  -     CBC auto differential; Future; Expected date: 04/14/2021    Hyperlipidemia, unspecified hyperlipidemia type    Anxiety  -     hydrOXYzine pamoate (VISTARIL) 25 MG Cap; Take 1 capsule (25 mg total) by mouth every 8 (eight) hours as needed (anxiety).  Dispense: 30 capsule; Refill: 2    Hypothyroidism, unspecified type  -     TSH; Future; Expected date: 04/14/2021         1. DM2 - continue Metformin 750mg BID;  diet and exercise; trulicity  2. HTN (controlled) - continue Avapro 150mg daily and Maxzide  3. gout - allopurinol 200mg daily  4. hypothyroidism - continue Levothyroxine 25mcg daily  5. GERD (controlled) - continue Prilosec 20mg daily  5. HLD - crestor 10mg daily  F/u 6 months with labs in clinic

## 2020-10-29 ENCOUNTER — PATIENT OUTREACH (OUTPATIENT)
Dept: ADMINISTRATIVE | Facility: HOSPITAL | Age: 70
End: 2020-10-29

## 2020-10-29 ENCOUNTER — TELEPHONE (OUTPATIENT)
Dept: FAMILY MEDICINE | Facility: CLINIC | Age: 70
End: 2020-10-29

## 2020-10-29 NOTE — LETTER
AUTHORIZATION FOR RELEASE OF   CONFIDENTIAL INFORMATION    Sam Meneses MD    We are seeing Nahomi Loving, date of birth 1950, in the clinic at [facility]. Crow Rahman MD is the patient's PCP. Nahomi Loving has an outstanding lab/procedure at the time we reviewed her chart. In order to help keep her health information updated, she has authorized us to request the following medical record(s):       EYE EXAM                    Please fax records to [facility], Kevin C. Plaisance, MD,  [phone]     If you have any questions, please contact Romi Brewster, Care Coordinator   at [phone].            Patient Name: Nahomi Loving  : 1950  Patient Phone #: [phone]

## 2020-10-29 NOTE — TELEPHONE ENCOUNTER
----- Message from Crow Rahman MD sent at 10/16/2020  3:39 PM CDT -----  F/u 6 months with labs in physical appt

## 2020-11-30 ENCOUNTER — PATIENT OUTREACH (OUTPATIENT)
Dept: ADMINISTRATIVE | Facility: HOSPITAL | Age: 70
End: 2020-11-30

## 2020-11-30 NOTE — PROGRESS NOTES
Updated HM with eye exam report. Positive Retinopathy.    Letter sent to patient,  Due for EYE EXAM

## 2020-11-30 NOTE — LETTER
November 30, 2020    Nahomi Loving  [id number]  [place] [id number]             [facility]  [address]  [facility] [address]  Phone: [phone] .Dear Nahomi Loving    Your [facility] primary care team is dedicated to assisting you achieve your health goal.  In order to maintain your goal, scheduling your diabetic health maintenance requirements are tapia to successful disease management.     Crow Rahman MD has reviewed your records and found that you are overdue for your diabetic eye exam.  Yearly eye exams are especially important, as this can identify early eye complications and disease caused by your diabetes.  Crow Rahman MD has requested you schedule your diabetic eye exam and encourages you to schedule at any of the [facility] locations.  You can be seen conveniently at the [facility] location by calling [phone].  If convenient, I will be happy to assist you in scheduling the appointment via the patient portal.    If you have already completed this exam at an outside facility, please notify us so we may request a copy of the exam and update your chart.     Sincerely,     Romi Brewster, Care Coordinator  [facility]  Phone: [phone]  Fax: [phone]

## 2020-12-04 ENCOUNTER — PATIENT OUTREACH (OUTPATIENT)
Dept: ADMINISTRATIVE | Facility: HOSPITAL | Age: 70
End: 2020-12-04

## 2020-12-04 NOTE — PROGRESS NOTES
Non-compliant GAP report chart review - Chart review completed for the following HM test if overdue  ( Dilated EYE EXAM)  12/04/2020    Care Everywhere and Media reports - updates requested and reviewed.        EYE EXAM REPORT      RECENTLY REVIEWED/OUTREACHED

## 2020-12-17 ENCOUNTER — TELEPHONE (OUTPATIENT)
Dept: FAMILY MEDICINE | Facility: CLINIC | Age: 70
End: 2020-12-17

## 2020-12-17 ENCOUNTER — OFFICE VISIT (OUTPATIENT)
Dept: FAMILY MEDICINE | Facility: CLINIC | Age: 70
End: 2020-12-17
Payer: MEDICARE

## 2020-12-17 VITALS
HEART RATE: 100 BPM | HEIGHT: 60 IN | OXYGEN SATURATION: 98 % | DIASTOLIC BLOOD PRESSURE: 68 MMHG | SYSTOLIC BLOOD PRESSURE: 102 MMHG | BODY MASS INDEX: 28.57 KG/M2 | WEIGHT: 145.5 LBS

## 2020-12-17 DIAGNOSIS — L50.9 HIVES: Primary | ICD-10-CM

## 2020-12-17 PROCEDURE — 99214 PR OFFICE/OUTPT VISIT, EST, LEVL IV, 30-39 MIN: ICD-10-PCS | Mod: HCNC,S$GLB,, | Performed by: NURSE PRACTITIONER

## 2020-12-17 PROCEDURE — 1159F MED LIST DOCD IN RCRD: CPT | Mod: HCNC,S$GLB,, | Performed by: NURSE PRACTITIONER

## 2020-12-17 PROCEDURE — 99999 PR PBB SHADOW E&M-EST. PATIENT-LVL V: CPT | Mod: PBBFAC,HCNC,, | Performed by: NURSE PRACTITIONER

## 2020-12-17 PROCEDURE — 99999 PR PBB SHADOW E&M-EST. PATIENT-LVL V: ICD-10-PCS | Mod: PBBFAC,HCNC,, | Performed by: NURSE PRACTITIONER

## 2020-12-17 PROCEDURE — 3288F FALL RISK ASSESSMENT DOCD: CPT | Mod: HCNC,CPTII,S$GLB, | Performed by: NURSE PRACTITIONER

## 2020-12-17 PROCEDURE — 3078F PR MOST RECENT DIASTOLIC BLOOD PRESSURE < 80 MM HG: ICD-10-PCS | Mod: HCNC,CPTII,S$GLB, | Performed by: NURSE PRACTITIONER

## 2020-12-17 PROCEDURE — 1101F PT FALLS ASSESS-DOCD LE1/YR: CPT | Mod: HCNC,CPTII,S$GLB, | Performed by: NURSE PRACTITIONER

## 2020-12-17 PROCEDURE — 3078F DIAST BP <80 MM HG: CPT | Mod: HCNC,CPTII,S$GLB, | Performed by: NURSE PRACTITIONER

## 2020-12-17 PROCEDURE — 3074F SYST BP LT 130 MM HG: CPT | Mod: HCNC,CPTII,S$GLB, | Performed by: NURSE PRACTITIONER

## 2020-12-17 PROCEDURE — 1126F PR PAIN SEVERITY QUANTIFIED, NO PAIN PRESENT: ICD-10-PCS | Mod: HCNC,S$GLB,, | Performed by: NURSE PRACTITIONER

## 2020-12-17 PROCEDURE — 3074F PR MOST RECENT SYSTOLIC BLOOD PRESSURE < 130 MM HG: ICD-10-PCS | Mod: HCNC,CPTII,S$GLB, | Performed by: NURSE PRACTITIONER

## 2020-12-17 PROCEDURE — 3288F PR FALLS RISK ASSESSMENT DOCUMENTED: ICD-10-PCS | Mod: HCNC,CPTII,S$GLB, | Performed by: NURSE PRACTITIONER

## 2020-12-17 PROCEDURE — 3008F BODY MASS INDEX DOCD: CPT | Mod: HCNC,CPTII,S$GLB, | Performed by: NURSE PRACTITIONER

## 2020-12-17 PROCEDURE — 1159F PR MEDICATION LIST DOCUMENTED IN MEDICAL RECORD: ICD-10-PCS | Mod: HCNC,S$GLB,, | Performed by: NURSE PRACTITIONER

## 2020-12-17 PROCEDURE — 99214 OFFICE O/P EST MOD 30 MIN: CPT | Mod: HCNC,S$GLB,, | Performed by: NURSE PRACTITIONER

## 2020-12-17 PROCEDURE — 3008F PR BODY MASS INDEX (BMI) DOCUMENTED: ICD-10-PCS | Mod: HCNC,CPTII,S$GLB, | Performed by: NURSE PRACTITIONER

## 2020-12-17 PROCEDURE — 1126F AMNT PAIN NOTED NONE PRSNT: CPT | Mod: HCNC,S$GLB,, | Performed by: NURSE PRACTITIONER

## 2020-12-17 PROCEDURE — 1101F PR PT FALLS ASSESS DOC 0-1 FALLS W/OUT INJ PAST YR: ICD-10-PCS | Mod: HCNC,CPTII,S$GLB, | Performed by: NURSE PRACTITIONER

## 2020-12-17 RX ORDER — CLINDAMYCIN HYDROCHLORIDE 300 MG/1
300 CAPSULE ORAL 4 TIMES DAILY
COMMUNITY
Start: 2020-12-09 | End: 2021-03-05 | Stop reason: ALTCHOICE

## 2020-12-17 RX ORDER — CETIRIZINE HYDROCHLORIDE 10 MG/1
10 TABLET ORAL NIGHTLY
Qty: 10 TABLET | Refills: 0 | Status: SHIPPED | OUTPATIENT
Start: 2020-12-17 | End: 2021-03-05

## 2020-12-17 RX ORDER — FAMOTIDINE 40 MG/1
40 TABLET, FILM COATED ORAL DAILY
Qty: 10 TABLET | Refills: 0 | Status: SHIPPED | OUTPATIENT
Start: 2020-12-17 | End: 2021-06-11

## 2020-12-17 RX ORDER — METHYLPREDNISOLONE 4 MG/1
TABLET ORAL
Qty: 1 PACKAGE | Refills: 0 | Status: SHIPPED | OUTPATIENT
Start: 2020-12-17 | End: 2021-01-07

## 2020-12-17 NOTE — TELEPHONE ENCOUNTER
I want her to take the breakfast and lunch doses for Day 1 of the pack at the same time today only. Take the rest of the doses and indicated on the pack.

## 2020-12-17 NOTE — TELEPHONE ENCOUNTER
----- Message from Tammy Velazquez sent at 12/17/2020 10:29 AM CST -----  Contact: Pt  Pt is requesting call back to advise her on how to take Metlyprednisolone 4 mg     Pt wants to know when she needs to take two at one time. Please call pt back at [phone]

## 2020-12-17 NOTE — PROGRESS NOTES
Subjective:       Patient ID: Nahomi Loving is a 70 y.o. female.    Chief Complaint: Rash    Patient states that she broke out in hives Tuesday afternoon, went to Urgent Care yesterday and they gave her some type of a steroid shot. She had an episode of hives 2005, had extensive testing at that times and she was told she was allergic to almonds and egg whites. Monday she did eat [place] roasted chicken and rice with peas and tasted cucumber dip and hummus. She has a dental infection, has been on clindamycin since last month for recurrent infection, started a new bottle/prescription 12/9/202. She has been taking benadryl every 4 hours and benadryl cream.   She had hives in the 19080s as well.   She has DM, her blood sugar was 134 this am.     Past Medical History:  No date: Asthma  No date: Depression with anxiety  No date: Diabetes mellitus type II  No date: GERD (gastroesophageal reflux disease)  No date: Gout, chronic  No date: HTN (hypertension)  No date: Hypothyroidism  No date: Psoriasis  6/2014: Shingles      Comment:  right upper back    Past Surgical History:  No date: APPENDECTOMY  No date: CARPAL TUNNEL RELEASE      Comment:  right  No date: CHOLECYSTECTOMY  No date: HYSTERECTOMY      Comment:  age 33  No date: hystesterectomy  No date: KNEE ARTHROSCOPY W/ DEBRIDEMENT      Comment:  left x 2  No date: TONSILLECTOMY, ADENOIDECTOMY  No date: TOTAL KNEE ARTHROPLASTY; Right    Review of patient's family history indicates:  Problem: Diabetes      Relation: Sister          Age of Onset: (Not Specified)  Problem: Lung cancer      Relation: Brother          Age of Onset: (Not Specified)      Social History    Socioeconomic History      Marital status:       Spouse name: Not on file      Number of children: Not on file      Years of education: Not on file      Highest education level: Not on file    Occupational History      Occupation: [employer]    Social Needs      Financial resource  strain: Not on file      Food insecurity        Worry: Not on file        Inability: Not on file      Transportation needs        Medical: Not on file        Non-medical: Not on file    Tobacco Use      Smoking status: Current Every Day Smoker        Types: Cigarettes      Smokeless tobacco: Never Used      Tobacco comment: 5 cigs a day    Substance and Sexual Activity      Alcohol use: Not on file      Drug use: Never      Sexual activity: Not on file    Lifestyle      Physical activity        Days per week: Not on file        Minutes per session: Not on file      Stress: Not on file    Relationships      Social connections        Talks on phone: Not on file        Gets together: Not on file        Attends [employer] service: Not on file        Active member of club or organization: Not on file        Attends meetings of clubs or organizations: Not on file        Relationship status: Not on file    Other Topics      Concerns:        Not on file    Social History Narrative      Not on file      Current Outpatient Medications:  allopurinoL (ZYLOPRIM) 100 MG tablet, Take 1 tablet (100 mg total) by mouth 2 (two) times daily., Disp: 180 tablet, Rfl: 3  aspirin (ECOTRIN) 81 MG EC tablet, Take 81 mg by mouth once daily.  , Disp: , Rfl:   azelastine (OPTIVAR) 0.05 % ophthalmic solution, Place 1 drop into both eyes 2 (two) times daily., Disp: 6 mL, Rfl: 11  azelastine-fluticasone 137-50 mcg/spray [place] nassal spray, 1 spray by Each Nare route 2 (two) times daily., Disp: 23 g, Rfl: 11  betamethasone valerate 0.1% (VALISONE) 0.1 % Crea, Apply topically 2 (two) times daily. for 10 days, Disp: 45 g, Rfl: 3  calcium-vitamin D3 (OS-MYNOR 500 + D3) 500 mg(1,250mg) -200 unit per tablet, Take 1 tablet by mouth., Disp: , Rfl:   clindamycin (CLEOCIN) 300 MG capsule, Take 300 mg by mouth 4 (four) times daily., Disp: , Rfl:   clobetasol (TEMOVATE) 0.05 % cream, , Disp: , Rfl:   COSENTYX PEN, 2 PENS, 150 mg/mL PnIj, , Disp: , Rfl:    diclofenac sodium (VOLTAREN) 1 % Gel, Apply 2 g topically 4 (four) times daily as needed., Disp: 100 g, Rfl: 2  dulaglutide (TRULICITY) 0.75 mg/0.5 mL pen injector, Inject 0.75 mg into the skin once a week., Disp: 4 pen, Rfl: 11  fluticasone propionate (FLONASE) 50 mcg/actuation nasal spray, 1 spray (50 mcg total) by Each Nostril route once daily., Disp: 16 g, Rfl: 11  hydrOXYzine pamoate (VISTARIL) 25 MG Cap, Take 1 capsule (25 mg total) by mouth every 8 (eight) hours as needed (anxiety)., Disp: 30 capsule, Rfl: 2  irbesartan (AVAPRO) 150 MG tablet, Take 1 tablet (150 mg total) by mouth every evening., Disp: 90 tablet, Rfl: 3  levothyroxine (SYNTHROID) 25 MCG tablet, Take 1 tablet (25 mcg total) by mouth once daily., Disp: 90 tablet, Rfl: 2  metFORMIN (GLUCOPHAGE-XR) 750 MG ER 24hr tablet, Take 1 tablet (750 mg total) by mouth 2 (two) times daily with meals., Disp: 180 tablet, Rfl: 3  metronidazole 0.75% (METROCREAM) 0.75 % Crea, Apply topically 2 (two) times daily. Please inactivate all prior scripts with same name and strength including any scripts on hold., Disp: 45 g, Rfl: 5  omeprazole (PRILOSEC) 20 MG capsule, Take 1 capsule (20 mg total) by mouth once daily., Disp: 90 capsule, Rfl: 2  rosuvastatin (CRESTOR) 10 MG tablet, Take 1 tablet (10 mg total) by mouth once daily., Disp: 90 tablet, Rfl: 3  triamterene-hydrochlorothiazide 75-50 mg (MAXZIDE) 75-50 mg per tablet, Take 1 tablet by mouth once daily., Disp: 90 tablet, Rfl: 3  zolpidem (AMBIEN) 10 mg Tab, Take 1 tablet (10 mg total) by mouth nightly as needed., Disp: 30 tablet, Rfl: 5  multivitamin capsule, Take 1 capsule by mouth., Disp: , Rfl:   valacyclovir (VALTREX) 1000 MG tablet, Take 1 tablet (1,000 mg total) by mouth 3 (three) times daily. (Patient not taking: Reported on 12/17/2020), Disp: 21 tablet, Rfl: 0    No current facility-administered medications for this visit.       Review of patient's allergies indicates:   -- Egg derived -- Rash   -- Pcn  (penicillins) -- Other (See Comments)    --  Pt does not know if she is actually allergic but             was always told by her mother that she was             allergic   -- Sulfa (sulfonamide antibiotics) -- Other (See Comments)    --  Pt does not know what happens but was always told             by her mother that she was allergic since she was             a child    Review of Systems   Constitutional: Negative.  Negative for chills and fever.   HENT: Positive for facial swelling. Negative for trouble swallowing.    Respiratory: Negative.    Cardiovascular: Negative.    Genitourinary: Negative.    Integumentary:  Positive for rash.   Neurological: Negative.    Psychiatric/Behavioral: Negative.          Objective:      Physical Exam  Constitutional:       Appearance: Normal appearance.   HENT:      Head: Normocephalic and atraumatic.   Cardiovascular:      Rate and Rhythm: Normal rate and regular rhythm.      Heart sounds: No murmur.   Pulmonary:      Effort: Pulmonary effort is normal.      Breath sounds: Normal breath sounds.   Skin:     Findings: Rash present. Rash is urticarial.          Neurological:      General: No focal deficit present.      Mental Status: She is alert and oriented to person, place, and time.   Psychiatric:         Mood and Affect: Mood normal.         Behavior: Behavior normal.         Assessment:       1. Hives        Plan:       1. Hives  Go to ED for any worsening, particularly for wheezing or difficulty swallowing.  Start medrol dose pack today, add pepcid and zyrtec. Follow up if not resolving. Stop clindamycin for now. Avoid any unusual foods. See allergy if not resolving or for reoccurrence.   - methylPREDNISolone (MEDROL DOSEPACK) 4 mg tablet; use as directed  Dispense: 1 Package; Refill: 0  - famotidine (PEPCID) 40 MG tablet; Take 1 tablet (40 mg total) by mouth once daily. for 10 days  Dispense: 10 tablet; Refill: 0  - cetirizine (ZYRTEC) 10 MG tablet; Take 1 tablet (10 mg  total) by mouth every evening. for 10 days  Dispense: 10 tablet; Refill: 0

## 2020-12-18 NOTE — TELEPHONE ENCOUNTER
Spoke to pt to advise of provider's recommendations. Pt VU. Pt stated I took 3 yesterday and following the pack today. Advised I will notify provider. Pt VU. Please advise in provider's absence. Was this ok?

## 2021-01-10 ENCOUNTER — PATIENT OUTREACH (OUTPATIENT)
Dept: ADMINISTRATIVE | Facility: HOSPITAL | Age: 71
End: 2021-01-10

## 2021-01-20 ENCOUNTER — NURSE TRIAGE (OUTPATIENT)
Dept: ADMINISTRATIVE | Facility: CLINIC | Age: 71
End: 2021-01-20

## 2021-01-20 DIAGNOSIS — L50.9 HIVES: Primary | ICD-10-CM

## 2021-01-21 ENCOUNTER — TELEPHONE (OUTPATIENT)
Dept: FAMILY MEDICINE | Facility: CLINIC | Age: 71
End: 2021-01-21

## 2021-02-08 ENCOUNTER — PATIENT MESSAGE (OUTPATIENT)
Dept: FAMILY MEDICINE | Facility: CLINIC | Age: 71
End: 2021-02-08

## 2021-02-09 ENCOUNTER — TELEPHONE (OUTPATIENT)
Dept: FAMILY MEDICINE | Facility: CLINIC | Age: 71
End: 2021-02-09

## 2021-02-18 ENCOUNTER — PATIENT OUTREACH (OUTPATIENT)
Dept: ADMINISTRATIVE | Facility: HOSPITAL | Age: 71
End: 2021-02-18

## 2021-02-18 DIAGNOSIS — Z78.0 POST-MENOPAUSAL: ICD-10-CM

## 2021-02-18 DIAGNOSIS — Z12.31 ENCOUNTER FOR SCREENING MAMMOGRAM FOR MALIGNANT NEOPLASM OF BREAST: Primary | ICD-10-CM

## 2021-03-05 ENCOUNTER — OFFICE VISIT (OUTPATIENT)
Dept: FAMILY MEDICINE | Facility: CLINIC | Age: 71
End: 2021-03-05
Payer: MEDICARE

## 2021-03-05 VITALS
BODY MASS INDEX: 28.7 KG/M2 | HEIGHT: 60 IN | DIASTOLIC BLOOD PRESSURE: 60 MMHG | HEART RATE: 88 BPM | RESPIRATION RATE: 18 BRPM | SYSTOLIC BLOOD PRESSURE: 100 MMHG | WEIGHT: 146.19 LBS | TEMPERATURE: 98 F

## 2021-03-05 DIAGNOSIS — E11.9 TYPE 2 DIABETES MELLITUS WITHOUT COMPLICATION, WITHOUT LONG-TERM CURRENT USE OF INSULIN: Primary | ICD-10-CM

## 2021-03-05 DIAGNOSIS — E79.0 HYPERURICEMIA: ICD-10-CM

## 2021-03-05 DIAGNOSIS — E03.9 HYPOTHYROIDISM, UNSPECIFIED TYPE: ICD-10-CM

## 2021-03-05 DIAGNOSIS — I10 HYPERTENSION, UNSPECIFIED TYPE: ICD-10-CM

## 2021-03-05 DIAGNOSIS — E78.5 HYPERLIPIDEMIA, UNSPECIFIED HYPERLIPIDEMIA TYPE: ICD-10-CM

## 2021-03-05 PROCEDURE — 99499 RISK ADDL DX/OHS AUDIT: ICD-10-PCS | Mod: S$GLB,,, | Performed by: FAMILY MEDICINE

## 2021-03-05 PROCEDURE — 3074F PR MOST RECENT SYSTOLIC BLOOD PRESSURE < 130 MM HG: ICD-10-PCS | Mod: CPTII,S$GLB,, | Performed by: FAMILY MEDICINE

## 2021-03-05 PROCEDURE — 1101F PT FALLS ASSESS-DOCD LE1/YR: CPT | Mod: CPTII,S$GLB,, | Performed by: FAMILY MEDICINE

## 2021-03-05 PROCEDURE — 3074F SYST BP LT 130 MM HG: CPT | Mod: CPTII,S$GLB,, | Performed by: FAMILY MEDICINE

## 2021-03-05 PROCEDURE — 3044F HG A1C LEVEL LT 7.0%: CPT | Mod: CPTII,S$GLB,, | Performed by: FAMILY MEDICINE

## 2021-03-05 PROCEDURE — 1126F AMNT PAIN NOTED NONE PRSNT: CPT | Mod: S$GLB,,, | Performed by: FAMILY MEDICINE

## 2021-03-05 PROCEDURE — 99999 PR PBB SHADOW E&M-EST. PATIENT-LVL IV: CPT | Mod: PBBFAC,,, | Performed by: FAMILY MEDICINE

## 2021-03-05 PROCEDURE — 3288F PR FALLS RISK ASSESSMENT DOCUMENTED: ICD-10-PCS | Mod: CPTII,S$GLB,, | Performed by: FAMILY MEDICINE

## 2021-03-05 PROCEDURE — 1159F PR MEDICATION LIST DOCUMENTED IN MEDICAL RECORD: ICD-10-PCS | Mod: S$GLB,,, | Performed by: FAMILY MEDICINE

## 2021-03-05 PROCEDURE — 1126F PR PAIN SEVERITY QUANTIFIED, NO PAIN PRESENT: ICD-10-PCS | Mod: S$GLB,,, | Performed by: FAMILY MEDICINE

## 2021-03-05 PROCEDURE — 3044F PR MOST RECENT HEMOGLOBIN A1C LEVEL <7.0%: ICD-10-PCS | Mod: CPTII,S$GLB,, | Performed by: FAMILY MEDICINE

## 2021-03-05 PROCEDURE — 99214 PR OFFICE/OUTPT VISIT, EST, LEVL IV, 30-39 MIN: ICD-10-PCS | Mod: S$GLB,,, | Performed by: FAMILY MEDICINE

## 2021-03-05 PROCEDURE — 1101F PR PT FALLS ASSESS DOC 0-1 FALLS W/OUT INJ PAST YR: ICD-10-PCS | Mod: CPTII,S$GLB,, | Performed by: FAMILY MEDICINE

## 2021-03-05 PROCEDURE — 3008F PR BODY MASS INDEX (BMI) DOCUMENTED: ICD-10-PCS | Mod: CPTII,S$GLB,, | Performed by: FAMILY MEDICINE

## 2021-03-05 PROCEDURE — 99214 OFFICE O/P EST MOD 30 MIN: CPT | Mod: S$GLB,,, | Performed by: FAMILY MEDICINE

## 2021-03-05 PROCEDURE — 3288F FALL RISK ASSESSMENT DOCD: CPT | Mod: CPTII,S$GLB,, | Performed by: FAMILY MEDICINE

## 2021-03-05 PROCEDURE — 1159F MED LIST DOCD IN RCRD: CPT | Mod: S$GLB,,, | Performed by: FAMILY MEDICINE

## 2021-03-05 PROCEDURE — 99999 PR PBB SHADOW E&M-EST. PATIENT-LVL IV: ICD-10-PCS | Mod: PBBFAC,,, | Performed by: FAMILY MEDICINE

## 2021-03-05 PROCEDURE — 3078F DIAST BP <80 MM HG: CPT | Mod: CPTII,S$GLB,, | Performed by: FAMILY MEDICINE

## 2021-03-05 PROCEDURE — 99499 UNLISTED E&M SERVICE: CPT | Mod: S$GLB,,, | Performed by: FAMILY MEDICINE

## 2021-03-05 PROCEDURE — 3078F PR MOST RECENT DIASTOLIC BLOOD PRESSURE < 80 MM HG: ICD-10-PCS | Mod: CPTII,S$GLB,, | Performed by: FAMILY MEDICINE

## 2021-03-05 PROCEDURE — 3008F BODY MASS INDEX DOCD: CPT | Mod: CPTII,S$GLB,, | Performed by: FAMILY MEDICINE

## 2021-03-05 RX ORDER — LEVOCETIRIZINE DIHYDROCHLORIDE 5 MG/1
5 TABLET, FILM COATED ORAL NIGHTLY
COMMUNITY
End: 2021-06-11

## 2021-03-05 RX ORDER — PSEUDOEPHEDRINE HCL 120 MG/1
120 TABLET, FILM COATED, EXTENDED RELEASE ORAL
COMMUNITY

## 2021-03-10 ENCOUNTER — TELEPHONE (OUTPATIENT)
Dept: FAMILY MEDICINE | Facility: CLINIC | Age: 71
End: 2021-03-10

## 2021-03-11 LAB
ALBUMIN SERPL-MCNC: 4.5 G/DL (ref 3.6–5.1)
ALBUMIN/CREAT UR: 4 MCG/MG CREAT
ALBUMIN/GLOB SERPL: 1.7 (CALC) (ref 1–2.5)
ALP SERPL-CCNC: 65 U/L (ref 37–153)
ALT SERPL-CCNC: 17 U/L (ref 6–29)
AST SERPL-CCNC: 19 U/L (ref 10–35)
BASOPHILS # BLD AUTO: 81 CELLS/UL (ref 0–200)
BASOPHILS NFR BLD AUTO: 1 %
BILIRUB SERPL-MCNC: 0.3 MG/DL (ref 0.2–1.2)
BUN SERPL-MCNC: 22 MG/DL (ref 7–25)
BUN/CREAT SERPL: NORMAL (CALC) (ref 6–22)
CALCIUM SERPL-MCNC: 9.7 MG/DL (ref 8.6–10.4)
CHLORIDE SERPL-SCNC: 98 MMOL/L (ref 98–110)
CHOLEST SERPL-MCNC: 119 MG/DL
CHOLEST/HDLC SERPL: 2 (CALC)
CO2 SERPL-SCNC: 29 MMOL/L (ref 20–32)
CREAT SERPL-MCNC: 0.91 MG/DL (ref 0.5–0.99)
CREAT UR-MCNC: 99 MG/DL (ref 20–275)
EOSINOPHIL # BLD AUTO: 259 CELLS/UL (ref 15–500)
EOSINOPHIL NFR BLD AUTO: 3.2 %
ERYTHROCYTE [DISTWIDTH] IN BLOOD BY AUTOMATED COUNT: 14 % (ref 11–15)
GFRSERPLBLD MDRD-ARVRAT: 65 ML/MIN/1.73M2
GLOBULIN SER CALC-MCNC: 2.7 G/DL (CALC) (ref 1.9–3.7)
GLUCOSE SERPL-MCNC: 89 MG/DL (ref 65–99)
HBA1C MFR BLD: 6 % OF TOTAL HGB
HCT VFR BLD AUTO: 36.8 % (ref 35–45)
HDLC SERPL-MCNC: 60 MG/DL
HGB BLD-MCNC: 13.1 G/DL (ref 11.7–15.5)
LDLC SERPL CALC-MCNC: 34 MG/DL (CALC)
LYMPHOCYTES # BLD AUTO: 2560 CELLS/UL (ref 850–3900)
LYMPHOCYTES NFR BLD AUTO: 31.6 %
MCH RBC QN AUTO: 31.3 PG (ref 27–33)
MCHC RBC AUTO-ENTMCNC: 35.6 G/DL (ref 32–36)
MCV RBC AUTO: 88 FL (ref 80–100)
MICROALBUMIN UR-MCNC: 0.4 MG/DL
MONOCYTES # BLD AUTO: 680 CELLS/UL (ref 200–950)
MONOCYTES NFR BLD AUTO: 8.4 %
NEUTROPHILS # BLD AUTO: 4520 CELLS/UL (ref 1500–7800)
NEUTROPHILS NFR BLD AUTO: 55.8 %
NONHDLC SERPL-MCNC: 59 MG/DL (CALC)
PLATELET # BLD AUTO: 310 THOUSAND/UL (ref 140–400)
PMV BLD REES-ECKER: 11.8 FL (ref 7.5–12.5)
POTASSIUM SERPL-SCNC: 3.9 MMOL/L (ref 3.5–5.3)
PROT SERPL-MCNC: 7.2 G/DL (ref 6.1–8.1)
RBC # BLD AUTO: 4.18 MILLION/UL (ref 3.8–5.1)
SODIUM SERPL-SCNC: 137 MMOL/L (ref 135–146)
TRIGL SERPL-MCNC: 171 MG/DL
TSH SERPL-ACNC: 0.67 MIU/L (ref 0.4–4.5)
WBC # BLD AUTO: 8.1 THOUSAND/UL (ref 3.8–10.8)

## 2021-04-07 RX ORDER — ZOLPIDEM TARTRATE 10 MG/1
10 TABLET ORAL NIGHTLY PRN
Qty: 30 TABLET | Refills: 5 | Status: SHIPPED | OUTPATIENT
Start: 2021-04-07

## 2021-04-29 ENCOUNTER — TELEPHONE (OUTPATIENT)
Dept: FAMILY MEDICINE | Facility: CLINIC | Age: 71
End: 2021-04-29

## 2021-05-27 ENCOUNTER — PATIENT MESSAGE (OUTPATIENT)
Dept: ADMINISTRATIVE | Facility: HOSPITAL | Age: 71
End: 2021-05-27

## 2021-05-27 ENCOUNTER — PATIENT OUTREACH (OUTPATIENT)
Dept: ADMINISTRATIVE | Facility: HOSPITAL | Age: 71
End: 2021-05-27

## 2021-06-11 ENCOUNTER — OFFICE VISIT (OUTPATIENT)
Dept: FAMILY MEDICINE | Facility: CLINIC | Age: 71
End: 2021-06-11
Payer: MEDICARE

## 2021-06-11 VITALS
HEIGHT: 60 IN | BODY MASS INDEX: 28.61 KG/M2 | DIASTOLIC BLOOD PRESSURE: 70 MMHG | SYSTOLIC BLOOD PRESSURE: 108 MMHG | TEMPERATURE: 99 F | HEART RATE: 88 BPM | OXYGEN SATURATION: 96 % | WEIGHT: 145.75 LBS

## 2021-06-11 DIAGNOSIS — B96.29 UTI DUE TO EXTENDED-SPECTRUM BETA LACTAMASE (ESBL) PRODUCING ESCHERICHIA COLI: ICD-10-CM

## 2021-06-11 DIAGNOSIS — E79.0 HYPERURICEMIA: ICD-10-CM

## 2021-06-11 DIAGNOSIS — I10 HYPERTENSION, UNSPECIFIED TYPE: ICD-10-CM

## 2021-06-11 DIAGNOSIS — Z16.12 UTI DUE TO EXTENDED-SPECTRUM BETA LACTAMASE (ESBL) PRODUCING ESCHERICHIA COLI: ICD-10-CM

## 2021-06-11 DIAGNOSIS — E78.5 HYPERLIPIDEMIA, UNSPECIFIED HYPERLIPIDEMIA TYPE: ICD-10-CM

## 2021-06-11 DIAGNOSIS — N39.0 UTI DUE TO EXTENDED-SPECTRUM BETA LACTAMASE (ESBL) PRODUCING ESCHERICHIA COLI: ICD-10-CM

## 2021-06-11 DIAGNOSIS — E11.9 TYPE 2 DIABETES MELLITUS WITHOUT COMPLICATION, WITHOUT LONG-TERM CURRENT USE OF INSULIN: Primary | ICD-10-CM

## 2021-06-11 DIAGNOSIS — E03.9 HYPOTHYROIDISM, UNSPECIFIED TYPE: ICD-10-CM

## 2021-06-11 PROCEDURE — 99214 OFFICE O/P EST MOD 30 MIN: CPT | Mod: S$GLB,,, | Performed by: FAMILY MEDICINE

## 2021-06-11 PROCEDURE — 99499 RISK ADDL DX/OHS AUDIT: ICD-10-PCS | Mod: S$GLB,,, | Performed by: FAMILY MEDICINE

## 2021-06-11 PROCEDURE — 1126F AMNT PAIN NOTED NONE PRSNT: CPT | Mod: S$GLB,,, | Performed by: FAMILY MEDICINE

## 2021-06-11 PROCEDURE — 3078F DIAST BP <80 MM HG: CPT | Mod: CPTII,S$GLB,, | Performed by: FAMILY MEDICINE

## 2021-06-11 PROCEDURE — 1159F PR MEDICATION LIST DOCUMENTED IN MEDICAL RECORD: ICD-10-PCS | Mod: S$GLB,,, | Performed by: FAMILY MEDICINE

## 2021-06-11 PROCEDURE — 3288F PR FALLS RISK ASSESSMENT DOCUMENTED: ICD-10-PCS | Mod: CPTII,S$GLB,, | Performed by: FAMILY MEDICINE

## 2021-06-11 PROCEDURE — 3044F HG A1C LEVEL LT 7.0%: CPT | Mod: CPTII,S$GLB,, | Performed by: FAMILY MEDICINE

## 2021-06-11 PROCEDURE — 3044F PR MOST RECENT HEMOGLOBIN A1C LEVEL <7.0%: ICD-10-PCS | Mod: CPTII,S$GLB,, | Performed by: FAMILY MEDICINE

## 2021-06-11 PROCEDURE — 1159F MED LIST DOCD IN RCRD: CPT | Mod: S$GLB,,, | Performed by: FAMILY MEDICINE

## 2021-06-11 PROCEDURE — 99214 PR OFFICE/OUTPT VISIT, EST, LEVL IV, 30-39 MIN: ICD-10-PCS | Mod: S$GLB,,, | Performed by: FAMILY MEDICINE

## 2021-06-11 PROCEDURE — 3078F PR MOST RECENT DIASTOLIC BLOOD PRESSURE < 80 MM HG: ICD-10-PCS | Mod: CPTII,S$GLB,, | Performed by: FAMILY MEDICINE

## 2021-06-11 PROCEDURE — 3074F SYST BP LT 130 MM HG: CPT | Mod: CPTII,S$GLB,, | Performed by: FAMILY MEDICINE

## 2021-06-11 PROCEDURE — 3008F PR BODY MASS INDEX (BMI) DOCUMENTED: ICD-10-PCS | Mod: CPTII,S$GLB,, | Performed by: FAMILY MEDICINE

## 2021-06-11 PROCEDURE — 1101F PT FALLS ASSESS-DOCD LE1/YR: CPT | Mod: CPTII,S$GLB,, | Performed by: FAMILY MEDICINE

## 2021-06-11 PROCEDURE — 3008F BODY MASS INDEX DOCD: CPT | Mod: CPTII,S$GLB,, | Performed by: FAMILY MEDICINE

## 2021-06-11 PROCEDURE — 3074F PR MOST RECENT SYSTOLIC BLOOD PRESSURE < 130 MM HG: ICD-10-PCS | Mod: CPTII,S$GLB,, | Performed by: FAMILY MEDICINE

## 2021-06-11 PROCEDURE — 1126F PR PAIN SEVERITY QUANTIFIED, NO PAIN PRESENT: ICD-10-PCS | Mod: S$GLB,,, | Performed by: FAMILY MEDICINE

## 2021-06-11 PROCEDURE — 3288F FALL RISK ASSESSMENT DOCD: CPT | Mod: CPTII,S$GLB,, | Performed by: FAMILY MEDICINE

## 2021-06-11 PROCEDURE — 99999 PR PBB SHADOW E&M-EST. PATIENT-LVL III: CPT | Mod: PBBFAC,,, | Performed by: FAMILY MEDICINE

## 2021-06-11 PROCEDURE — 99999 PR PBB SHADOW E&M-EST. PATIENT-LVL III: ICD-10-PCS | Mod: PBBFAC,,, | Performed by: FAMILY MEDICINE

## 2021-06-11 PROCEDURE — 99499 UNLISTED E&M SERVICE: CPT | Mod: S$GLB,,, | Performed by: FAMILY MEDICINE

## 2021-06-11 PROCEDURE — 1101F PR PT FALLS ASSESS DOC 0-1 FALLS W/OUT INJ PAST YR: ICD-10-PCS | Mod: CPTII,S$GLB,, | Performed by: FAMILY MEDICINE

## 2021-06-11 RX ORDER — EPINEPHRINE 0.3 MG/.3ML
INJECTION SUBCUTANEOUS
COMMUNITY
Start: 2021-01-22

## 2021-06-28 ENCOUNTER — TELEPHONE (OUTPATIENT)
Dept: FAMILY MEDICINE | Facility: CLINIC | Age: 71
End: 2021-06-28

## 2021-06-28 ENCOUNTER — NURSE TRIAGE (OUTPATIENT)
Dept: ADMINISTRATIVE | Facility: CLINIC | Age: 71
End: 2021-06-28

## 2021-06-29 LAB
ALBUMIN SERPL-MCNC: 4.1 G/DL (ref 3.6–5.1)
ALBUMIN/GLOB SERPL: 1.6 (CALC) (ref 1–2.5)
ALP SERPL-CCNC: 67 U/L (ref 37–153)
ALT SERPL-CCNC: 16 U/L (ref 6–29)
APPEARANCE UR: ABNORMAL
AST SERPL-CCNC: 16 U/L (ref 10–35)
BACTERIA #/AREA URNS HPF: ABNORMAL /HPF
BASOPHILS # BLD AUTO: 59 CELLS/UL (ref 0–200)
BASOPHILS NFR BLD AUTO: 0.3 %
BILIRUB SERPL-MCNC: 0.5 MG/DL (ref 0.2–1.2)
BILIRUB UR QL STRIP: NEGATIVE
BUN SERPL-MCNC: 26 MG/DL (ref 7–25)
BUN/CREAT SERPL: 23 (CALC) (ref 6–22)
CALCIUM SERPL-MCNC: 9 MG/DL (ref 8.6–10.4)
CHLORIDE SERPL-SCNC: 99 MMOL/L (ref 98–110)
CHOLEST SERPL-MCNC: 108 MG/DL
CHOLEST/HDLC SERPL: 1.8 (CALC)
CO2 SERPL-SCNC: 25 MMOL/L (ref 20–32)
COLOR UR: ABNORMAL
CREAT SERPL-MCNC: 1.13 MG/DL (ref 0.5–0.99)
EOSINOPHIL # BLD AUTO: 78 CELLS/UL (ref 15–500)
EOSINOPHIL NFR BLD AUTO: 0.4 %
ERYTHROCYTE [DISTWIDTH] IN BLOOD BY AUTOMATED COUNT: 13.7 % (ref 11–15)
GLOBULIN SER CALC-MCNC: 2.6 G/DL (CALC) (ref 1.9–3.7)
GLUCOSE SERPL-MCNC: 199 MG/DL (ref 65–99)
GLUCOSE UR QL STRIP: NEGATIVE
HBA1C MFR BLD: 6.1 % OF TOTAL HGB
HCT VFR BLD AUTO: 34.7 % (ref 35–45)
HDLC SERPL-MCNC: 59 MG/DL
HGB BLD-MCNC: 11.6 G/DL (ref 11.7–15.5)
HGB UR QL STRIP: ABNORMAL
HYALINE CASTS #/AREA URNS LPF: ABNORMAL /LPF
KETONES UR QL STRIP: NEGATIVE
LDLC SERPL CALC-MCNC: 33 MG/DL (CALC)
LEUKOCYTE ESTERASE UR QL STRIP: ABNORMAL
LYMPHOCYTES # BLD AUTO: 1392 CELLS/UL (ref 850–3900)
LYMPHOCYTES NFR BLD AUTO: 7.1 %
MCH RBC QN AUTO: 28.3 PG (ref 27–33)
MCHC RBC AUTO-ENTMCNC: 33.4 G/DL (ref 32–36)
MCV RBC AUTO: 84.6 FL (ref 80–100)
MONOCYTES # BLD AUTO: 1666 CELLS/UL (ref 200–950)
MONOCYTES NFR BLD AUTO: 8.5 %
NEUTROPHILS # BLD AUTO: ABNORMAL CELLS/UL (ref 1500–7800)
NEUTROPHILS NFR BLD AUTO: 83.7 %
NITRITE UR QL STRIP: POSITIVE
NONHDLC SERPL-MCNC: 49 MG/DL (CALC)
PH UR STRIP: 5.5 [PH] (ref 5–8)
PLATELET # BLD AUTO: 255 THOUSAND/UL (ref 140–400)
PMV BLD REES-ECKER: 11.9 FL (ref 7.5–12.5)
POTASSIUM SERPL-SCNC: 3.7 MMOL/L (ref 3.5–5.3)
PROT SERPL-MCNC: 6.7 G/DL (ref 6.1–8.1)
PROT UR QL STRIP: ABNORMAL
RBC # BLD AUTO: 4.1 MILLION/UL (ref 3.8–5.1)
RBC #/AREA URNS HPF: ABNORMAL /HPF
SODIUM SERPL-SCNC: 138 MMOL/L (ref 135–146)
SP GR UR STRIP: 1.01 (ref 1–1.03)
SQUAMOUS #/AREA URNS HPF: ABNORMAL /HPF
TRIGL SERPL-MCNC: 84 MG/DL
TSH SERPL-ACNC: 0.55 MIU/L (ref 0.4–4.5)
URATE SERPL-MCNC: 5.7 MG/DL (ref 2.5–7)
WBC # BLD AUTO: 19.6 THOUSAND/UL (ref 3.8–10.8)
WBC #/AREA URNS HPF: ABNORMAL /HPF

## 2021-06-30 ENCOUNTER — PATIENT MESSAGE (OUTPATIENT)
Dept: FAMILY MEDICINE | Facility: CLINIC | Age: 71
End: 2021-06-30

## 2021-06-30 DIAGNOSIS — Z16.12 UTI DUE TO EXTENDED-SPECTRUM BETA LACTAMASE (ESBL) PRODUCING ESCHERICHIA COLI: Primary | ICD-10-CM

## 2021-06-30 DIAGNOSIS — N39.0 UTI DUE TO EXTENDED-SPECTRUM BETA LACTAMASE (ESBL) PRODUCING ESCHERICHIA COLI: Primary | ICD-10-CM

## 2021-06-30 DIAGNOSIS — B96.29 UTI DUE TO EXTENDED-SPECTRUM BETA LACTAMASE (ESBL) PRODUCING ESCHERICHIA COLI: Primary | ICD-10-CM

## 2021-07-06 DIAGNOSIS — K21.9 GERD (GASTROESOPHAGEAL REFLUX DISEASE): ICD-10-CM

## 2021-07-08 RX ORDER — OMEPRAZOLE 20 MG/1
20 CAPSULE, DELAYED RELEASE ORAL DAILY
Qty: 90 CAPSULE | Refills: 3 | Status: SHIPPED | OUTPATIENT
Start: 2021-07-08 | End: 2022-06-29

## 2021-10-12 DIAGNOSIS — E79.0 HYPERURICEMIA: ICD-10-CM

## 2021-10-12 DIAGNOSIS — E11.9 TYPE 2 DIABETES MELLITUS WITHOUT COMPLICATION, WITHOUT LONG-TERM CURRENT USE OF INSULIN: ICD-10-CM

## 2021-10-12 DIAGNOSIS — E78.5 HYPERLIPIDEMIA, UNSPECIFIED HYPERLIPIDEMIA TYPE: ICD-10-CM

## 2021-10-12 DIAGNOSIS — E03.9 HYPOTHYROIDISM, UNSPECIFIED TYPE: ICD-10-CM

## 2021-10-13 RX ORDER — LEVOTHYROXINE SODIUM 25 UG/1
25 TABLET ORAL DAILY
Qty: 90 TABLET | Refills: 2 | Status: SHIPPED | OUTPATIENT
Start: 2021-10-13

## 2021-10-13 RX ORDER — DULAGLUTIDE 0.75 MG/.5ML
0.75 INJECTION, SOLUTION SUBCUTANEOUS WEEKLY
Qty: 12 PEN | Refills: 0 | Status: SHIPPED | OUTPATIENT
Start: 2021-10-13

## 2021-10-13 RX ORDER — METFORMIN HYDROCHLORIDE 750 MG/1
750 TABLET, EXTENDED RELEASE ORAL 2 TIMES DAILY WITH MEALS
Qty: 180 TABLET | Refills: 0 | Status: SHIPPED | OUTPATIENT
Start: 2021-10-13 | End: 2022-01-15

## 2021-10-13 RX ORDER — ROSUVASTATIN CALCIUM 10 MG/1
10 TABLET, COATED ORAL DAILY
Qty: 90 TABLET | Refills: 3 | Status: SHIPPED | OUTPATIENT
Start: 2021-10-13 | End: 2022-10-13

## 2021-10-13 RX ORDER — ALLOPURINOL 100 MG/1
100 TABLET ORAL 2 TIMES DAILY
Qty: 180 TABLET | Refills: 2 | Status: SHIPPED | OUTPATIENT
Start: 2021-10-13

## 2022-01-14 NOTE — TELEPHONE ENCOUNTER
Care Due:                  Date            Visit Type   Department     Provider  --------------------------------------------------------------------------------                                             [facility]  Last Visit: 06-      None         MEDICINE       TIERNEY BLANCHARD  Next Visit: None Scheduled  None         None Found                                                            Last  Test          Frequency    Reason                     Performed    Due Date  --------------------------------------------------------------------------------    HBA1C.......  6 months...  TRULICITY, metFORMIN.....  06- 12-    Powered by [employer]. Reference number: [id number].   1/14/2022 9:35:31 AM CST

## 2022-01-15 RX ORDER — METFORMIN HYDROCHLORIDE 750 MG/1
TABLET, EXTENDED RELEASE ORAL
Qty: 180 TABLET | Refills: 0 | Status: SHIPPED | OUTPATIENT
Start: 2022-01-15

## 2022-03-24 ENCOUNTER — PATIENT OUTREACH (OUTPATIENT)
Dept: ADMINISTRATIVE | Facility: HOSPITAL | Age: 72
End: 2022-03-24
Payer: MEDICARE

## 2022-03-24 ENCOUNTER — PATIENT MESSAGE (OUTPATIENT)
Dept: ADMINISTRATIVE | Facility: HOSPITAL | Age: 72
End: 2022-03-24
Payer: MEDICARE

## 2022-03-24 DIAGNOSIS — E11.9 DIABETES MELLITUS WITHOUT COMPLICATION: ICD-10-CM

## 2022-03-24 DIAGNOSIS — Z78.0 POST-MENOPAUSAL: Primary | ICD-10-CM

## 2022-03-24 NOTE — PROGRESS NOTES
Chart review completed for the following HM test:   Care Everywhere and Media reports - updates requested and reviewed.      Routine Dilated Eye Exam test for Diabetic Retinopathy       LEFT VM TO RETURN CALL,  ALSO PORTAL SENT       Also due for:  Osteoporosis screening (DEXA SCAN) order placed  Diabetic lab testing  Pneumonia Immunization  Urine test for your kidneys (Urine Microalbumin) order placed

## 2022-03-28 ENCOUNTER — PATIENT MESSAGE (OUTPATIENT)
Dept: ADMINISTRATIVE | Facility: HOSPITAL | Age: 72
End: 2022-03-28
Payer: MEDICARE

## 2022-05-31 ENCOUNTER — PATIENT MESSAGE (OUTPATIENT)
Dept: ADMINISTRATIVE | Facility: HOSPITAL | Age: 72
End: 2022-05-31
Payer: MEDICARE

## 2022-06-09 ENCOUNTER — PATIENT MESSAGE (OUTPATIENT)
Dept: ADMINISTRATIVE | Facility: HOSPITAL | Age: 72
End: 2022-06-09
Payer: MEDICARE

## 2022-06-29 DIAGNOSIS — K21.9 GERD (GASTROESOPHAGEAL REFLUX DISEASE): ICD-10-CM

## 2022-06-29 RX ORDER — OMEPRAZOLE 20 MG/1
CAPSULE, DELAYED RELEASE ORAL
Qty: 90 CAPSULE | Refills: 0 | Status: SHIPPED | OUTPATIENT
Start: 2022-06-29

## 2022-06-29 NOTE — TELEPHONE ENCOUNTER
Care Due:                  Date            Visit Type   Department     Provider  --------------------------------------------------------------------------------                                EP -                              PRIMARY      [facility] FAMILY  Last Visit: 06-      [facility] ([facility]   MEDICINE       TIERNEY BLANCHARD  Next Visit: None Scheduled  None         None Found                                                            Last  Test          Frequency    Reason                     Performed    Due Date  --------------------------------------------------------------------------------    Office Visit  12 months..  TRULICITY, allopurinoL,    06- 06-                             irbesartan,                             levothyroxine, metFORMIN,                             omeprazole, rosuvastatin,                             triamterene-hydrochloroth                             iazide...................    CBC.........  12 months..  allopurinoL..............  Not Found    Overdue    CMP.........  12 months..  allopurinoL, irbesartan,   06- 06-                             metFORMIN, rosuvastatin,                             triamterene-hydrochloroth                             iazide...................    HBA1C.......  6 months...  TRULICITY, metFORMIN.....  06- 12-    Lipid Panel.  12 months..  rosuvastatin.............  06- 06-    TSH.........  12 months..  levothyroxine............  06-   06-    Uric Acid...  12 months..  allopurinoL..............  06- 06-    [facility] Embedded Care Gaps. Reference number: [id number]. 6/29/2022   3:36:40 PM CDT

## 2022-06-30 ENCOUNTER — PATIENT OUTREACH (OUTPATIENT)
Dept: ADMINISTRATIVE | Facility: HOSPITAL | Age: 72
End: 2022-06-30
Payer: MEDICARE

## 2022-06-30 ENCOUNTER — PATIENT MESSAGE (OUTPATIENT)
Dept: ADMINISTRATIVE | Facility: HOSPITAL | Age: 72
End: 2022-06-30
Payer: MEDICARE

## 2022-06-30 NOTE — TELEPHONE ENCOUNTER
Refill Decision Note   Nahomi Loving  is requesting a refill authorization.  Brief Assessment and Rationale for Refill:  Approve    -Medication-Related Problems Identified:   Requires labs  Requires appointment  Medication Therapy Plan:  PPI and H2RA use acceptable per [facility] protocol; approve    Medication Reconciliation Completed: No   Comments:     Provider Staff:     Action is required for this patient.   Please see care gap opportunities below in Care Due Message.     Thanks!  [facility]     Appointments      Date Provider   Last Visit   6/11/2021 Crow Rahman MD   Next Visit   Visit date not found Crow Rahman MD     Note composed:7:18 PM 06/29/2022           Note composed:7:18 PM 06/29/2022

## 2022-06-30 NOTE — PROGRESS NOTES
Uncontrolled BP REPORT  BP Readings from Last 3 Encounters:   06/11/21 108/70   05/05/21 118/70   03/05/21 100/60       Non-compliant report chart audits for HYPERTENSION MANAGEMENT     Outreach to patient in reference to hypertension management       BLOOD PRESSURE FOLLOW UP NEEDED WITH A CARE TEAM MEMBER    ACTIVE PORTAL MESSAGE OR LETTER SENT

## 2022-07-15 ENCOUNTER — PATIENT MESSAGE (OUTPATIENT)
Dept: ADMINISTRATIVE | Facility: HOSPITAL | Age: 72
End: 2022-07-15
Payer: MEDICARE

## 2022-07-15 ENCOUNTER — PATIENT OUTREACH (OUTPATIENT)
Dept: ADMINISTRATIVE | Facility: HOSPITAL | Age: 72
End: 2022-07-15
Payer: MEDICARE

## 2022-07-15 NOTE — PROGRESS NOTES
COLON CANCER SCREENING  Non-compliant report chart audits for COLON CANCER SCREENING for Patients     Outreach to patient in reference to a COLON CANCER SCREENING  Chart review completed - Care Everywhere and Media reports - updates requested and reviewed.      ACTIVE PORTAL MESSAGE SENT OR LETTER COMMUNICATION MAILED

## 2022-07-27 DIAGNOSIS — Z12.11 COLON CANCER SCREENING: ICD-10-CM

## 2022-07-28 ENCOUNTER — PATIENT OUTREACH (OUTPATIENT)
Dept: ADMINISTRATIVE | Facility: HOSPITAL | Age: 72
End: 2022-07-28
Payer: MEDICARE

## 2022-07-28 NOTE — LETTER
July 28, 2022    Nahomi Loving  [id number]  [place] [id number]             [facility]  [address]  [facility] [address]  Phone: [phone] Dear Nahomi,    We have tried to reach you by [facility] email unsuccessfully.    [facility] is committed to your overall health. After reviewing your chart found that you are due for a routine office visit with Crow Rahman MD.  Please schedule an appointment with Crow Rahman MD at your earliest convenience by calling [phone] or by going to [email or web address]. You may also be due for the following test and/or procedures:    Health Maintenance Due   Topic    DEXA Scan     Shingles Vaccine     Eye Exam     Pneumococcal Vaccines     COVID-19 Vaccine     Hemoglobin A1c     Foot Exam     Diabetes Urine Screening     Colorectal Cancer Screening     Lipid Panel      If you already have any of the above scheduled, please disregard this message.  If you have had any of the above done at a non-[facility] facility, please notify us so that we may obtain a copy or bring a copy to your next Primary Care visit.    Your [facility] care team is committed to supporting your overall health. We look forward to seeing you soon and appreciate the opportunity to serve you.      Sincerely,     Crow Rahman MD and your [facility] Primary Care Team

## 2022-07-28 NOTE — PROGRESS NOTES
DUE FOR AN OFFICE VISIT WITH Crow Rahman MD.  LAST SEEN >12 MONTHS  Portal outreach un-read by patient.  Outreach mailed 07/28/2022    Health Maintenance Due   Topic Date Due    DEXA Scan  Never done    Shingles Vaccine (1 of 2) Never done    Eye Exam  08/26/2020    Pneumococcal Vaccines (Age 65+) (2 - PPSV23 or PCV20) 01/08/2021    COVID-19 Vaccine (3 - Booster for Pfizer series) 08/15/2021    Hemoglobin A1c  12/28/2021    Foot Exam  03/05/2022    Diabetes Urine Screening  03/10/2022    Colorectal Cancer Screening  05/20/2022    Lipid Panel  06/28/2022

## 2022-08-01 ENCOUNTER — PATIENT MESSAGE (OUTPATIENT)
Dept: ADMINISTRATIVE | Facility: HOSPITAL | Age: 72
End: 2022-08-01
Payer: MEDICARE

## 2022-08-08 ENCOUNTER — PATIENT OUTREACH (OUTPATIENT)
Dept: ADMINISTRATIVE | Facility: HOSPITAL | Age: 72
End: 2022-08-08
Payer: MEDICARE

## 2022-08-08 NOTE — PROGRESS NOTES
Uncontrolled A1C >8    LEFT MESSAGE TO RETURN CALL.  SEVERAL ATTEMPTS AND PORTAL LETTER MESSAGES SENT    DUE FOR ROUTINE OFFICE VISIT       Hemoglobin A1C   Date Value Ref Range Status   06/28/2021 6.1 (H) <5.7 % of total Hgb Final     Comment:     For someone without known diabetes, a hemoglobin   A1c value between 5.7% and 6.4% is consistent with  prediabetes and should be confirmed with a   follow-up test.     For someone with known diabetes, a value <7%  indicates that their diabetes is well controlled. A1c  targets should be individualized based on duration of  diabetes, age, comorbid conditions, and other  considerations.     This assay result is consistent with an increased risk  of diabetes.     Currently, no consensus exists regarding use of  hemoglobin A1c for diagnosis of diabetes for children.        03/10/2021 6.0 (H) <5.7 % of total Hgb Final     Comment:     For someone without known diabetes, a hemoglobin   A1c value between 5.7% and 6.4% is consistent with  prediabetes and should be confirmed with a   follow-up test.     For someone with known diabetes, a value <7%  indicates that their diabetes is well controlled. A1c  targets should be individualized based on duration of  diabetes, age, comorbid conditions, and other  considerations.     This assay result is consistent with an increased risk  of diabetes.     Currently, no consensus exists regarding use of  hemoglobin A1c for diagnosis of diabetes for children.        08/18/2020 5.4 <5.7 % of total Hgb Final     Comment:     For the purpose of screening for the presence of  diabetes:     <5.7%       Consistent with the absence of diabetes  5.7-6.4%    Consistent with increased risk for diabetes              (prediabetes)  > or =6.5%  Consistent with diabetes     This assay result is consistent with a decreased risk  of diabetes.     Currently, no consensus exists regarding use of  hemoglobin A1c for diagnosis of diabetes in children.     According  to American Diabetes Association (ADA)  guidelines, hemoglobin A1c <7.0% represents optimal  control in non-pregnant diabetic patients. Different  metrics may apply to specific patient populations.   Standards of Medical Care in Diabetes(ADA).

## 2022-10-03 ENCOUNTER — PATIENT OUTREACH (OUTPATIENT)
Dept: ADMINISTRATIVE | Facility: HOSPITAL | Age: 72
End: 2022-10-03
Payer: MEDICARE

## 2022-10-03 NOTE — PROGRESS NOTES
Uncontrolled A1C [facility] REPORT       LEFT MESSAGE TO RETURN CALL.  SEVERAL ATTEMPTS AND PORTAL LETTER MESSAGES SENT  Due for office visit, labs, eye exam, and colon screening    Hemoglobin A1C   Date Value Ref Range Status   06/28/2021 6.1 (H) <5.7 % of total Hgb Final     Comment:     For someone without known diabetes, a hemoglobin   A1c value between 5.7% and 6.4% is consistent with  prediabetes and should be confirmed with a   follow-up test.     For someone with known diabetes, a value <7%  indicates that their diabetes is well controlled. A1c  targets should be individualized based on duration of  diabetes, age, comorbid conditions, and other  considerations.     This assay result is consistent with an increased risk  of diabetes.     Currently, no consensus exists regarding use of  hemoglobin A1c for diagnosis of diabetes for children.        03/10/2021 6.0 (H) <5.7 % of total Hgb Final     Comment:     For someone without known diabetes, a hemoglobin   A1c value between 5.7% and 6.4% is consistent with  prediabetes and should be confirmed with a   follow-up test.     For someone with known diabetes, a value <7%  indicates that their diabetes is well controlled. A1c  targets should be individualized based on duration of  diabetes, age, comorbid conditions, and other  considerations.     This assay result is consistent with an increased risk  of diabetes.     Currently, no consensus exists regarding use of  hemoglobin A1c for diagnosis of diabetes for children.        08/18/2020 5.4 <5.7 % of total Hgb Final     Comment:     For the purpose of screening for the presence of  diabetes:     <5.7%       Consistent with the absence of diabetes  5.7-6.4%    Consistent with increased risk for diabetes              (prediabetes)  > or =6.5%  Consistent with diabetes     This assay result is consistent with a decreased risk  of diabetes.     Currently, no consensus exists regarding use of  hemoglobin A1c for  diagnosis of diabetes in children.     According to American Diabetes Association (ADA)  guidelines, hemoglobin A1c <7.0% represents optimal  control in non-pregnant diabetic patients. Different  metrics may apply to specific patient populations.   Standards of Medical Care in Diabetes(ADA).

## 2023-01-06 ENCOUNTER — PATIENT OUTREACH (OUTPATIENT)
Dept: ADMINISTRATIVE | Facility: HOSPITAL | Age: 73
End: 2023-01-06
Payer: MEDICARE

## 2023-01-06 ENCOUNTER — PATIENT MESSAGE (OUTPATIENT)
Dept: ADMINISTRATIVE | Facility: HOSPITAL | Age: 73
End: 2023-01-06
Payer: MEDICARE

## 2023-08-24 ENCOUNTER — PATIENT MESSAGE (OUTPATIENT)
Dept: ADMINISTRATIVE | Facility: HOSPITAL | Age: 73
End: 2023-08-24
Payer: MEDICARE

## 2023-08-24 ENCOUNTER — PATIENT OUTREACH (OUTPATIENT)
Dept: ADMINISTRATIVE | Facility: HOSPITAL | Age: 73
End: 2023-08-24
Payer: MEDICARE

## 2023-08-24 NOTE — PROGRESS NOTES
DUE FOR AN OFFICE VISIT WITH Josiah, Crow FORD MD.      Health Maintenance Due   Topic Date Due    DEXA Scan  Never done    Shingles Vaccine (1 of 2) Never done    Pneumococcal Vaccines (Age 65+) (2 - PPSV23 or PCV20) 03/04/2020    COVID-19 Vaccine (3 - Pfizer series) 05/10/2021    Colorectal Cancer Screening  05/20/2022    Lipid Panel  06/28/2022

## 2025-01-16 ENCOUNTER — TELEPHONE (OUTPATIENT)
Dept: FAMILY MEDICINE | Facility: CLINIC | Age: 75
End: 2025-01-16
Payer: MEDICARE

## 2025-01-16 NOTE — TELEPHONE ENCOUNTER
----- Message from [place] sent at 1/15/2025  4:17 PM CST -----  Type: Needs Medical Advice  Who Called:  Patient     Symptoms (please be specific):    How long has patient had these symptoms:    Pharmacy name and phone #:    Best Call Back Number: [phone]  Additional Information: Patient is requesting a call back for an appt. ASAP. Patient is needing sooner appt. to continue getting diabetic meds.

## 2025-01-21 ENCOUNTER — TELEPHONE (OUTPATIENT)
Dept: FAMILY MEDICINE | Facility: CLINIC | Age: 75
End: 2025-01-21
Payer: MEDICARE

## 2025-01-21 NOTE — TELEPHONE ENCOUNTER
Tried to reach pt. Clinic closed tomorrow due to weather. No answer. Left message for pt to call back. Buddy mandelg sent

## 2025-01-24 ENCOUNTER — TELEPHONE (OUTPATIENT)
Dept: FAMILY MEDICINE | Facility: CLINIC | Age: 75
End: 2025-01-24
Payer: MEDICARE

## 2025-01-24 NOTE — TELEPHONE ENCOUNTER
Left message that next available appt is 2/13. I scheduled pt for that time and advised in  message to call back if that does not work.--lp

## 2025-01-24 NOTE — TELEPHONE ENCOUNTER
----- Message from Rosangela sent at 1/23/2025  4:15 PM CST -----  Contact: Patient  Type:   Appointment Request        Name of Caller:Patient    When is the first available appointment?NA    Symptoms:Needs diabetic supplies / est care    Best Call Back Number:[phone]    Additional Information: Patient states that her appt was canceled due to the weather but she is now in need of her diabetic supplies. Please call to advise